# Patient Record
Sex: FEMALE | Race: WHITE | Employment: PART TIME | URBAN - METROPOLITAN AREA
[De-identification: names, ages, dates, MRNs, and addresses within clinical notes are randomized per-mention and may not be internally consistent; named-entity substitution may affect disease eponyms.]

---

## 2017-01-24 ENCOUNTER — OFFICE VISIT (OUTPATIENT)
Dept: FAMILY MEDICINE CLINIC | Age: 62
End: 2017-01-24

## 2017-01-24 ENCOUNTER — HOSPITAL ENCOUNTER (OUTPATIENT)
Dept: LAB | Age: 62
Discharge: HOME | End: 2017-01-24

## 2017-01-24 VITALS
HEART RATE: 77 BPM | DIASTOLIC BLOOD PRESSURE: 67 MMHG | WEIGHT: 237 LBS | SYSTOLIC BLOOD PRESSURE: 146 MMHG | TEMPERATURE: 98.4 F | BODY MASS INDEX: 41.32 KG/M2

## 2017-01-24 DIAGNOSIS — Z23 ENCOUNTER FOR IMMUNIZATION: ICD-10-CM

## 2017-01-24 DIAGNOSIS — E11.9 CONTROLLED TYPE 2 DIABETES MELLITUS WITHOUT COMPLICATION, WITHOUT LONG-TERM CURRENT USE OF INSULIN (HCC): Primary | ICD-10-CM

## 2017-01-24 DIAGNOSIS — M54.50 MIDLINE LOW BACK PAIN WITHOUT SCIATICA, UNSPECIFIED CHRONICITY: ICD-10-CM

## 2017-01-24 DIAGNOSIS — E11.9 CONTROLLED TYPE 2 DIABETES MELLITUS WITHOUT COMPLICATION, WITHOUT LONG-TERM CURRENT USE OF INSULIN (HCC): ICD-10-CM

## 2017-01-24 LAB
CHOLEST SERPL-MCNC: 144 MG/DL
EST. AVERAGE GLUCOSE BLD GHB EST-MCNC: 137 MG/DL
GLUCOSE POC: NORMAL MG/DL
HBA1C MFR BLD: 6.4 % (ref 4.2–6.3)
HDLC SERPL-MCNC: 57 MG/DL
HDLC SERPL: 2.5 {RATIO} (ref 0–5)
LDLC SERPL CALC-MCNC: 59.8 MG/DL (ref 0–100)
LIPID PROFILE,FLP: NORMAL
TRIGL SERPL-MCNC: 136 MG/DL (ref ?–150)
VLDLC SERPL CALC-MCNC: 27.2 MG/DL

## 2017-01-24 PROCEDURE — 83036 HEMOGLOBIN GLYCOSYLATED A1C: CPT

## 2017-01-24 PROCEDURE — 80061 LIPID PANEL: CPT

## 2017-01-24 RX ORDER — LISINOPRIL 10 MG/1
10 TABLET ORAL DAILY
Qty: 30 TAB | Refills: 1 | Status: SHIPPED | OUTPATIENT
Start: 2017-01-24 | End: 2017-03-20 | Stop reason: SDUPTHER

## 2017-01-24 NOTE — PROGRESS NOTES
Coordination of Care  1. Have you been to the ER, urgent care clinic since your last visit? Hospitalized since your last visit? No    2. Have you seen or consulted any other health care providers outside of the [facility] [place] since your last visit? Include any pap smears or colon screening.  No    Medications  Medication Reconciliation Performed: yes  Patient does not need refills     Learning Assessment Complete? yes  Results for orders placed or performed in visit on 01/24/17   AMB POC GLUCOSE BLOOD, BY GLUCOSE MONITORING DEVICE   Result Value Ref Range    Glucose  non fasting mg/dL

## 2017-01-24 NOTE — PROGRESS NOTES
Joy Sharma is an 57 y.o. female who is here for the follow up on diabetes. She reports doing OK, compliant with her medications of Metformin and Mevacor. Denies any side effects. Saw Opt[facility] at [facility] pump, but it was a long time ago, needs to schedule an appointment. Morning BG reading are: , average is 135  Evening BG readings are: 115-128, average is 125    The pt reports having back pain, which comes and goes. Aggravated by physical activity. Alleviated by rest. No radiation of the pain, no tingling, no numbness, no saddle anesthesia. She tried Advil with some relief. BP readings outside the clinic are 117-136 ( she brought the readings with her)    Got the Flu shot this year. Allergies - reviewed: Allergies   Allergen Reactions    Penicillins Unknown (comments)     As child. Medications - reviewed:   Current Outpatient Prescriptions   Medication Sig    lisinopril (PRINIVIL, ZESTRIL) 10 mg tablet Take 1 Tab by mouth daily.  lovastatin (MEVACOR) 20 mg tablet Take 2 Tabs by mouth nightly.  metFORMIN ER (GLUCOPHAGE XR) 500 mg tablet Take 2 Tabs by mouth two (2) times a day. No current facility-administered medications for this visit. Past Medical History - reviewed:  Past Medical History   Diagnosis Date    H/O mammogram 2012     every 6 mos due to polps on right breast with EWL    Hyperlipemia     Pap smear for cervical cancer screening 12/2010         Past Surgical History - reviewed:   No past surgical history on file. Social History - reviewed:  Social History     Social History    Marital status:      Spouse name: N/A    Number of children: N/A    Years of education: N/A     Occupational History    Not on file.      Social History Main Topics    Smoking status: Former Smoker     Packs/day: 0.50     Years: 16.00     Types: Cigarettes    Smokeless tobacco: Not on file      Comment: almost 30 years ago quit   [employer] Alcohol use No    Drug use: No    Sexual activity: Not on file     Other Topics Concern    Not on file     Social History Narrative         Family History - reviewed:  Family History   Problem Relation Age of Onset    Diabetes Mother     Kidney Disease Mother      polycystic kidney disease    Asthma Mother     Hypertension Mother     Heart Disease Mother     Diabetes Sister     Kidney Disease Sister      polycystic kidney disease    Heart Disease Maternal Grandmother     Hypertension Maternal Grandmother     Heart Disease Maternal Grandfather     Cancer Father      ?  Heart Disease Father     Hypertension Father          Immunizations - reviewed:   Immunization History   Administered Date(s) Administered    Influenza Vaccine 10/02/2016         ROS  Review of Systems : negative unless highlighted  EYES: diplopia. Blurry vision. Visual changes  CARDIOVASCULAR: chest pain. palpitations  RESPIRATORY: dyspnea. Cough. Wheeze. GI: abdominal pain. Hemetochezia. Melana. : dysuria. Hematuria. Urgency. Itching. Burning. Discharge. NEURO: numbness. Tingling. Weakness. MUSCULOSKELETAL: Back pain. arthralgias. Myalgias. Edema. Physical Exam  Visit Vitals    /67 (BP 1 Location: Left arm, BP Patient Position: Sitting)    Pulse 77    Temp 98.4 °F (36.9 °C) (Oral)    Wt 237 lb (107.5 kg)    BMI 41.32 kg/m2       General appearance - NAD. Appropriately conversational  Respiratory - LCTAB. No wheeze/rale/rhonchi  Heart - Normal rate, regular rhythm. No m/r/r  Abdomen - Soft, non tender. Non distended. Musculoskeletal - Mild tenderness in the lumbar area , over the paraspinal muscle bilaterally. Normal ROM, Gait normal.    Extremities - No LE edema. No cyanosis. Skin - normal coloration and normal turgor. No cyanosis, no rash. Assessment/Plan  1.  Controlled type 2 diabetes mellitus without complication, without long-term current use of insulin (HCC)    - AMB POC GLUCOSE BLOOD, BY GLUCOSE MONITORING DEVICE  - Will check HgA1C today  -The pt was advised to log in preprandial and postprandial reading of BG    2. Elevated blood pressureThe BP is in the higher range in the clinic than at home.   -Will start Lisinopril 10 mg for the elevated BP and for kidney protection as well    3. Back pain,  Most likely muscular-skeletal  - The patient was advised to try heating pad, stretching exercise   -Ibuprofen was recommended for the acute pain    Follow-up Disposition:  Return in about 4 weeks (around 2/21/2017) for BP follow-up. I discussed the aforementioned diagnoses with the patient as well as the plan of care.      The pt was seen and discussed with Dr. Sofía Hatch (The attending physician)    Shelia Leong MD  Family Medicine Resident  PGY 1

## 2017-01-24 NOTE — PROGRESS NOTES
Scott Kaye completed screening documentation. No contraindications for administering today's ordered vaccines. Vaccine Immunization Statement(s) given and instructions for adverse reaction. No adverse reaction noted at time of discharge, explained possible s/e. Reviewed symptoms indicating need to be seen in ER. Patient given ppv23 vaccine; denies fever. Pt had no adverse reaction at time of d/c. Vaccine administration documented into [place] Immunization Information System.

## 2017-01-24 NOTE — PATIENT INSTRUCTIONS
ACE Inhibitors: Care Instructions  Your Care Instructions  ACE (angiotensin-converting enzyme) inhibitors lower blood pressure. They also treat heart failure and prevent heart attacks and strokes. They block an enzyme that makes blood vessels narrow. As a result, the blood vessels relax and widen. This lowers blood pressure. These medicines also put more water and salt into the urine. This lowers blood pressure too. These medicines are a good choice for people with diabetes. They don't affect blood sugar levels, and they may protect the kidneys. Examples include:  · Benazepril (Lotensin). · Lisinopril (Prinivil, Zestril). · Ramipril (Altace). Before you start taking this medicine, make sure your doctor knows if you take other medicines, especially water pills (diuretics) or potassium tablets. And tell your doctor if you use a salt substitute. You should not take an ACE inhibitor if you are pregnant or planning to become pregnant. You may need regular blood tests. Follow-up care is a key part of your treatment and safety. Be sure to make and go to all appointments, and call your doctor if you are having problems. It's also a good idea to know your test results and keep a list of the medicines you take. How can you care for yourself at home? · Take your medicines exactly as prescribed. Be sure to take high blood pressure medicine every day. Since high blood pressure often has no symptoms, it is easy to forget to take the pills. Call your doctor if you think you are having a problem with your medicine. · ACE inhibitors can cause a dry cough. If the cough is bad, talk to your doctor. You may need to try a different medicine. · ACE inhibitors can cause an allergic reaction of the skin. Symptoms may range from mild swelling to painful welts. Severe swelling can make it hard to breathe, but this is very rare. · Check with your doctor or pharmacist before you use any other medicines.  This includes over-the-counter medicines. Make sure your doctor knows all of the medicines, vitamins, herbal products, and supplements you take. Taking some medicines together can cause problems. When should you call for help? Call your doctor now or seek immediate medical care if:  · You have trouble breathing. · You have swelling in your face, head, neck, or tongue. · You are dizzy or lightheaded, or you feel like you may faint. Watch closely for changes in your health, and be sure to contact your doctor if you have any problems. Where can you learn more? Go to http://huberF3 Foodsemory.info/. Enter Q050 in the search box to learn more about \"ACE Inhibitors: Care Instructions. \"  Current as of: January 27, 2016  Content Version: 11.1  © 2417-1873 [employer]. Care instructions adapted under license by [email or web address] (which disclaims liability or warranty for this information). If you have questions about a medical condition or this instruction, always ask your healthcare professional. [place] [address] any warranty or liability for your use of this information. Back Pain: Care Instructions  Your Care Instructions    Back pain has many possible causes. It is often related to problems with muscles and ligaments of the back. It may also be related to problems with the nerves, discs, or bones of the back. Moving, lifting, standing, sitting, or sleeping in an awkward way can strain the back. Sometimes you don't notice the injury until later. Arthritis is another common cause of back pain. Although it may hurt a lot, back pain usually improves on its own within several weeks. Most people recover in 12 weeks or less. Using good home treatment and being careful not to stress your back can help you feel better sooner. Follow-up care is a key part of your treatment and safety. Be sure to make and go to all appointments, and call your doctor if you are having problems.  Its also a good idea to know your test results and keep a list of the medicines you take. How can you care for yourself at home? · Sit or lie in positions that are most comfortable and reduce your pain. Try one of these positions when you lie down:  ¨ Lie on your back with your knees bent and supported by large pillows. ¨ Lie on the floor with your legs on the seat of a sofa or chair. [place] Left on your side with your knees and hips bent and a pillow between your legs. ¨ Lie on your stomach if it does not make pain worse. · Do not sit up in bed, and avoid soft couches and twisted positions. Bed rest can help relieve pain at first, but it delays healing. Avoid bed rest after the first day of back pain. · Change positions every 30 minutes. If you must sit for long periods of time, take breaks from sitting. Get up and walk around, or lie in a comfortable position. · Try using a heating pad on a low or medium setting for 15 to 20 minutes every 2 or 3 hours. Try a warm shower in place of one session with the heating pad. · You can also try an ice pack for 10 to 15 minutes every 2 to 3 hours. Put a thin cloth between the ice pack and your skin. · Take pain medicines exactly as directed. ¨ If the doctor gave you a prescription medicine for pain, take it as prescribed. ¨ If you are not taking a prescription pain medicine, ask your doctor if you can take an over-the-counter medicine. · Take short walks several times a day. You can start with 5 to 10 minutes, 3 or 4 times a day, and work up to longer walks. Walk on level surfaces and avoid hills and stairs until your back is better. · Return to work and other activities as soon as you can. Continued rest without activity is usually not good for your back. · To prevent future back pain, do exercises to stretch and strengthen your back and stomach. Learn how to use good posture, safe lifting techniques, and proper body mechanics. When should you call for help?   Call your doctor now or seek immediate medical care if:  · You have new or worsening numbness in your legs. · You have new or worsening weakness in your legs. (This could make it hard to stand up.)  · You lose control of your bladder or bowels. Watch closely for changes in your health, and be sure to contact your doctor if:  · Your pain gets worse. · You are not getting better after 2 weeks. Where can you learn more? Go to http://huber-emory.info/. Enter Q307 in the search box to learn more about \"Back Pain: Care Instructions. \"  Current as of: May 23, 2016  Content Version: 11.1  © 8431-0352 [employer]. Care instructions adapted under license by [employer] (which disclaims liability or warranty for this information). If you have questions about a medical condition or this instruction, always ask your healthcare professional. [address] any warranty or liability for your use of this information.

## 2017-01-24 NOTE — MR AVS SNAPSHOT
Visit Information Date & Time Provider Department Dept. Phone Encounter #  
 1/24/2017  1:35 PM Adolph Santos MD [facility] [phone] [id number] Follow-up Instructions Return in about 4 weeks (around 2/21/2017) for BP follow-up. Upcoming Health Maintenance Date Due Hepatitis C Screening 1955 EYE EXAM RETINAL OR DILATED Q1 12/27/1964 Pneumococcal 19-64 Medium Risk (1 of 1 - PPSV23) 12/27/1973 DTaP/Tdap/Td series (1 - Tdap) 12/27/1975 FOBT Q 1 YEAR AGE 50-75 12/27/2004 ZOSTER VACCINE AGE 60> 12/27/2014 HEMOGLOBIN A1C Q6M 1/20/2017 FOOT EXAM Q1 9/29/2017 MICROALBUMIN Q1 9/29/2017 LIPID PANEL Q1 9/29/2017 BREAST CANCER SCRN MAMMOGRAM 4/18/2018 PAP AKA CERVICAL CYTOLOGY 4/18/2019 Allergies as of 1/24/2017  Review Complete On: 1/24/2017 By: Adolph Santos MD  
  
 Severity Noted Reaction Type Reactions Penicillins Low 05/16/2013    Unknown (comments) As child. Current Immunizations  Reviewed on 1/24/2017 Name Date Influenza Vaccine 10/2/2016 Pneumococcal Polysaccharide (PPSV-23)  Incomplete Reviewed by Samm Carmen MD on 1/24/2017 at  1:37 PM  
 Reviewed by Samm Carmen MD on 1/24/2017 at  1:57 PM  
 Reviewed by Samm Carmen MD on 1/24/2017 at  1:57 PM  
You Were Diagnosed With   
  
 Codes Comments Controlled type 2 diabetes mellitus without complication, without long-term current use of insulin ([facility] [address]    -  Primary ICD-10-CM: E11.9 ICD-9-CM: 250.00 Hypertension associated with diabetes ([address]     ICD-10-CM: E11.59, I10 
ICD-9-CM: 250.80, 401.9 Midline low back pain without sciatica, unspecified chronicity     ICD-10-CM: M54.5 ICD-9-CM: 724.2 Encounter for immunization     ICD-10-CM: A90 ICD-9-CM: V03.89 Vitals BP Pulse Temp Weight(growth percentile) BMI OB Status 146/67 (BP 1 Location: Left arm, BP Patient Position: Sitting) 77 98.4 °F (36.9 °C) (Oral) 237 lb (107.5 kg) 41.32 kg/m2 Postmenopausal  
 Smoking Status Former Smoker Vitals History BMI and BSA Data Body Mass Index Body Surface Area  
 41.32 kg/m 2 2.19 m 2 Preferred Pharmacy Pharmacy Name Phone [facility] [address] [place] [address], [address] [place] [address],[address] [phone] Your Updated Medication List  
  
   
This list is accurate as of: 1/24/17  2:15 PM.  Always use your most recent med list.  
  
  
  
  
 lisinopril 10 mg tablet Commonly known as:  Teresa Primmer Take 1 Tab by mouth daily. lovastatin 20 mg tablet Commonly known as:  MEVACOR Take 2 Tabs by mouth nightly. metFORMIN  mg tablet Commonly known as:  GLUCOPHAGE XR Take 2 Tabs by mouth two (2) times a day. Prescriptions Sent to Pharmacy Refills  
 lisinopril (PRINIVIL, ZESTRIL) 10 mg tablet 1 Sig: Take 1 Tab by mouth daily. Class: Normal  
 Pharmacy: [address] [address] [facility], [address] [place] [address],[address] Ph #: [phone] Route: Oral  
  
We Performed the Following AMB POC GLUCOSE BLOOD, BY GLUCOSE MONITORING DEVICE [00475 CPT(R)] PNEUMOCOCCAL POLYSACCHARIDE VACCINE, 23-VALENT, ADULT OR IMMUNOSUPPRESSED PT DOSE, [21374 CPT(R)] WA IMMUNIZ ADMIN,1 SINGLE/COMB VAC/TOXOID [id number] CPT(R)] Follow-up Instructions Return in about 4 weeks (around 2/21/2017) for BP follow-up. To-Do List   
 01/24/2017 Lab:  HEMOGLOBIN A1C WITH EAG   
  
 01/24/2017 Lab:  LIPID PANEL Patient Instructions ACE Inhibitors: Care Instructions Your Care Instructions ACE (angiotensin-converting enzyme) inhibitors lower blood pressure. They also treat heart failure and prevent heart attacks and strokes. They block an enzyme that makes blood vessels narrow.  As a result, the blood vessels relax and widen. This lowers blood pressure. These medicines also put more water and salt into the urine. This lowers blood pressure too. These medicines are a good choice for people with diabetes. They don't affect blood sugar levels, and they may protect the kidneys. Examples include: · Benazepril (Lotensin). · Lisinopril (Prinivil, Zestril). · Ramipril (Altace). Before you start taking this medicine, make sure your doctor knows if you take other medicines, especially water pills (diuretics) or potassium tablets. And tell your doctor if you use a salt substitute. You should not take an ACE inhibitor if you are pregnant or planning to become pregnant. You may need regular blood tests. Follow-up care is a key part of your treatment and safety. Be sure to make and go to all appointments, and call your doctor if you are having problems. It's also a good idea to know your test results and keep a list of the medicines you take. How can you care for yourself at home? · Take your medicines exactly as prescribed. Be sure to take high blood pressure medicine every day. Since high blood pressure often has no symptoms, it is easy to forget to take the pills. Call your doctor if you think you are having a problem with your medicine. · ACE inhibitors can cause a dry cough. If the cough is bad, talk to your doctor. You may need to try a different medicine. · ACE inhibitors can cause an allergic reaction of the skin. Symptoms may range from mild swelling to painful welts. Severe swelling can make it hard to breathe, but this is very rare. · Check with your doctor or pharmacist before you use any other medicines. This includes over-the-counter medicines. Make sure your doctor knows all of the medicines, vitamins, herbal products, and supplements you take. Taking some medicines together can cause problems. When should you call for help? Call your doctor now or seek immediate medical care if: · You have trouble breathing. · You have swelling in your face, head, neck, or tongue. · You are dizzy or lightheaded, or you feel like you may faint. Watch closely for changes in your health, and be sure to contact your doctor if you have any problems. Where can you learn more? Go to http://huber-emory.info/. Enter Z153 in the search box to learn more about \"ACE Inhibitors: Care Instructions. \" Current as of: January 27, 2016 Content Version: 11.1 © 6250-8062 [employer]. Care instructions adapted under license by [employer] (which disclaims liability or warranty for this information). If you have questions about a medical condition or this instruction, always ask your healthcare professional. [address] any warranty or liability for your use of this information. Back Pain: Care Instructions Your Care Instructions Back pain has many possible causes. It is often related to problems with muscles and ligaments of the back. It may also be related to problems with the nerves, discs, or bones of the back. Moving, lifting, standing, sitting, or sleeping in an awkward way can strain the back. Sometimes you don't notice the injury until later. Arthritis is another common cause of back pain. Although it may hurt a lot, back pain usually improves on its own within several weeks. Most people recover in 12 weeks or less. Using good home treatment and being careful not to stress your back can help you feel better sooner. Follow-up care is a key part of your treatment and safety. Be sure to make and go to all appointments, and call your doctor if you are having problems. Its also a good idea to know your test results and keep a list of the medicines you take. How can you care for yourself at home? · Sit or lie in positions that are most comfortable and reduce your pain. Try one of these positions when you lie down: ¨ Lie on your back with your knees bent and supported by large pillows. ¨ Lie on the floor with your legs on the seat of a sofa or chair. Clevester Sanes on your side with your knees and hips bent and a pillow between your legs. ¨ Lie on your stomach if it does not make pain worse. · Do not sit up in bed, and avoid soft couches and twisted positions. Bed rest can help relieve pain at first, but it delays healing. Avoid bed rest after the first day of back pain. · Change positions every 30 minutes. If you must sit for long periods of time, take breaks from sitting. Get up and walk around, or lie in a comfortable position. · Try using a heating pad on a low or medium setting for 15 to 20 minutes every 2 or 3 hours. Try a warm shower in place of one session with the heating pad. · You can also try an ice pack for 10 to 15 minutes every 2 to 3 hours. Put a thin cloth between the ice pack and your skin. · Take pain medicines exactly as directed. ¨ If the doctor gave you a prescription medicine for pain, take it as prescribed. ¨ If you are not taking a prescription pain medicine, ask your doctor if you can take an over-the-counter medicine. · Take short walks several times a day. You can start with 5 to 10 minutes, 3 or 4 times a day, and work up to longer walks. Walk on level surfaces and avoid hills and stairs until your back is better. · Return to work and other activities as soon as you can. Continued rest without activity is usually not good for your back. · To prevent future back pain, do exercises to stretch and strengthen your back and stomach. Learn how to use good posture, safe lifting techniques, and proper body mechanics. When should you call for help? Call your doctor now or seek immediate medical care if: 
· You have new or worsening numbness in your legs. · You have new or worsening weakness in your legs. (This could make it hard to stand up.) · You lose control of your bladder or bowels. Watch closely for changes in your health, and be sure to contact your doctor if: 
· Your pain gets worse. · You are not getting better after 2 weeks. Where can you learn more? Go to http://huber-emory.info/. Enter S598 in the search box to learn more about \"Back Pain: Care Instructions. \" Current as of: May 23, 2016 Content Version: 11.1 © 7284-8768 [employer]. Care instructions adapted under license by [employer] (which disclaims liability or warranty for this information). If you have questions about a medical condition or this instruction, always ask your healthcare professional. [place] [address] any warranty or liability for your use of this information. Introducing [facility]! [address] introduces [employer] patient portal. Now you can access parts of your medical record, email your doctor's office, and request medication refills online. 1. In your internet browser, go to [email or web address] 2. Click on the First Time User? Click Here link in the Sign In box. You will see the New Member Sign Up page. 3. Enter your [employer] Access Code exactly as it appears below. You will not need to use this code after youve completed the sign-up process. If you do not sign up before the expiration date, you must request a new code. · [employer] Access Code: [email or web address] Expires: 1/31/2017 11:20 AM 
 
4. Enter the last four digits of your Social Security Number (xxxx) and Date of Birth (mm/dd/yyyy) as indicated and click Submit. You will be taken to the next sign-up page. 5. Create a [employer] ID. This will be your [employer] login ID and cannot be changed, so think of one that is secure and easy to remember. 6. Create a [employer] password. You can change your password at any time. 7. Enter your Password Reset Question and Answer. This can be used at a later time if you forget your password. 8. Enter your e-mail address. You will receive e-mail notification when new information is available in [address] 9. Click Sign Up. You can now view and download portions of your medical record. 10. Click the Download Summary menu link to download a portable copy of your medical information. If you have questions, please visit the Frequently Asked Questions section of the [employer] website. Remember, [employer] is NOT to be used for urgent needs. For medical emergencies, dial 911. Now available from your iPhone and Android! Please provide this summary of care documentation to your next provider. Your primary care clinician is listed as Marcus Walton. If you have any questions after today's visit, please call [phone].

## 2017-02-21 ENCOUNTER — OFFICE VISIT (OUTPATIENT)
Dept: FAMILY MEDICINE CLINIC | Age: 62
End: 2017-02-21

## 2017-02-21 VITALS
TEMPERATURE: 98.6 F | DIASTOLIC BLOOD PRESSURE: 58 MMHG | HEART RATE: 78 BPM | BODY MASS INDEX: 40.8 KG/M2 | SYSTOLIC BLOOD PRESSURE: 135 MMHG | WEIGHT: 234 LBS

## 2017-02-21 DIAGNOSIS — I10 ESSENTIAL HYPERTENSION: ICD-10-CM

## 2017-02-21 DIAGNOSIS — E11.9 CONTROLLED TYPE 2 DIABETES MELLITUS WITHOUT COMPLICATION, WITHOUT LONG-TERM CURRENT USE OF INSULIN (HCC): Primary | ICD-10-CM

## 2017-02-21 DIAGNOSIS — E78.00 PURE HYPERCHOLESTEROLEMIA: ICD-10-CM

## 2017-02-21 LAB — GLUCOSE POC: NORMAL MG/DL

## 2017-02-21 NOTE — PROGRESS NOTES
Coordination of Care  1. Have you been to the ER, urgent care clinic since your last visit? Hospitalized since your last visit? No    2. Have you seen or consulted any other health care providers outside of the [facility] since your last visit? Include any pap smears or colon screening.  No    Medications  Medication Reconciliation Performed: yes  Patient does need refills     Learning Assessment Complete? yes  Results for orders placed or performed in visit on 02/21/17   AMB POC GLUCOSE BLOOD, BY GLUCOSE MONITORING DEVICE   Result Value Ref Range    Glucose  nonfasting mg/dL

## 2017-02-21 NOTE — PROGRESS NOTES
[facility] - pt given appointment on April 27th at 11 am.  Asked pt to take the referral form with her and $15 fee.

## 2017-02-21 NOTE — PROGRESS NOTES
Subjective:     Blake Schmitz is a 57 y.o. female who presents for follow up of diabetes and hyperlipidemia. Having some chronic low back pain that she says limits doing much exercise. No problems after starting lisinopril. Patient Active Problem List   Diagnosis Code    Chronic lower back pain M54.5, G89.29    Elevated blood pressure I10    Controlled diabetes mellitus type II without complication ([facility]) R21.0    Pure hypercholesterolemia E78.00       Current Outpatient Prescriptions   Medication Sig    lisinopril (PRINIVIL, ZESTRIL) 10 mg tablet Take 1 Tab by mouth daily.  lovastatin (MEVACOR) 20 mg tablet Take 2 Tabs by mouth nightly.  metFORMIN ER (GLUCOPHAGE XR) 500 mg tablet Take 2 Tabs by mouth two (2) times a day. No current facility-administered medications for this visit. Allergies   Allergen Reactions    Penicillins Unknown (comments)     As child. Diet and Lifestyle: follows a diabetic diet regularly      AM glucoses:  120 - 150  Post Prandial glucoses:  90 - 160  Evening glucoses:  130 - 150    BP readings outside office:  120 - 130 / 50 - 70. Cardiovascular ROS: taking medications as instructed, no medication side effects noted, no TIA's, no chest pain on exertion, no dyspnea on exertion, no swelling of ankles. Review of Systems, additional:  Pertinent items are noted in HPI. Family History   Problem Relation Age of Onset    Diabetes Mother     Kidney Disease Mother      polycystic kidney disease    Asthma Mother     Hypertension Mother     Heart Disease Mother     Diabetes Sister     Kidney Disease Sister      polycystic kidney disease    Heart Disease Maternal Grandmother     Hypertension Maternal Grandmother     Heart Disease Maternal Grandfather     Cancer Father      ?     Heart Disease Father     Hypertension Father      Social History   Substance Use Topics    Smoking status: Former Smoker     Packs/day: 0.50     Years: 16.00 Types: Cigarettes    Smokeless tobacco: Not on file      Comment: almost 30 years ago quit    Alcohol use No          Objective:     Visit Vitals    /58 (BP 1 Location: Right arm, BP Patient Position: Sitting)    Pulse 78    Temp 98.6 °F (37 °C) (Oral)    Wt 234 lb (106.1 kg)    BMI 40.8 kg/m2     Body mass index is 40.8 kg/(m^2).     Physical Exam  General appearance: alert, cooperative, no distress, appears stated age    Lungs: clear to auscultation bilaterally, no wheezes, no increased work of breathing  Heart: regular rate and rhythm, S1, S2 normal, no murmur, click, rub or gallop  Extremities: extremities normal, no cyanosis or edema  Skin: color, texture, turgor normal. No rashes or lesions  Neurologic: Alert and oriented, grossly normal exam. Normal coordination and gait      Lab results below reviewed at this visit:  Results for orders placed or performed in visit on 02/21/17   AMB POC GLUCOSE BLOOD, BY GLUCOSE MONITORING DEVICE   Result Value Ref Range    Glucose  nonfasting mg/dL       Lab Results   Component Value Date/Time    Hemoglobin A1c 6.4 01/24/2017 02:26 PM       Lab Results   Component Value Date/Time    Sodium 139 06/06/2016 03:48 PM    Potassium 3.8 06/06/2016 03:48 PM    Chloride 106 06/06/2016 03:48 PM    CO2 26 06/06/2016 03:48 PM    Anion gap 7 06/06/2016 03:48 PM    Glucose 224 06/06/2016 03:48 PM    BUN 11 06/06/2016 03:48 PM    Creatinine 0.70 06/06/2016 03:48 PM    BUN/Creatinine ratio 16 06/06/2016 03:48 PM    GFR est AA >60 06/06/2016 03:48 PM    GFR est non-AA >60 06/06/2016 03:48 PM    Calcium 8.4 06/06/2016 03:48 PM       Lab Results   Component Value Date/Time    Microalbumin/Creat ratio (mg/g creat) 6 09/29/2016 03:21 PM    Microalbumin,urine random 0.61 09/29/2016 03:21 PM       Lab Results   Component Value Date/Time    Cholesterol, total 144 01/24/2017 02:26 PM    HDL Cholesterol 57 01/24/2017 02:26 PM    LDL, calculated 59.8 01/24/2017 02:26 PM    VLDL, calculated 27.2 01/24/2017 02:26 PM    Triglyceride 136 01/24/2017 02:26 PM    CHOL/HDL Ratio 2.5 01/24/2017 02:26 PM         Assessment/Plan:       ICD-10-CM ICD-9-CM    1. Controlled type 2 diabetes mellitus without complication, without long-term current use of insulin ([facility]) E11.9 250.00 AMB POC GLUCOSE BLOOD, BY GLUCOSE MONITORING DEVICE      REFERRAL TO OPHTHALMOLOGY   2. Pure hypercholesterolemia E78.00 272.0    3. Essential hypertension I10 401.9        She seems to be doing quite well. Discussed taking NSAID's for her back pain. Encouraged to try to move more for cardiovascular benefit and help with back. Consider decreasing metformin if she continues to have a low A1c later. Referral to [facility] for eye exam.      Follow-up Disposition:  Return in about 3 months (around 5/21/2017).

## 2017-03-20 DIAGNOSIS — I10 UNSPECIFIED ESSENTIAL HYPERTENSION: Primary | ICD-10-CM

## 2017-04-03 RX ORDER — LISINOPRIL 10 MG/1
TABLET ORAL
Qty: 30 TAB | Refills: 1 | Status: SHIPPED | OUTPATIENT
Start: 2017-04-03 | End: 2017-05-23 | Stop reason: SDUPTHER

## 2017-04-03 NOTE — TELEPHONE ENCOUNTER
Pt called to ask for refill of Lisinopril, she ran out 3 days ago.   Refilled #30/1 pt has f/u appt in May

## 2017-04-08 ENCOUNTER — HOSPITAL ENCOUNTER (OUTPATIENT)
Dept: MAMMOGRAPHY | Age: 62
Discharge: HOME | End: 2017-04-08
Attending: NURSE PRACTITIONER

## 2017-04-08 ENCOUNTER — OFFICE VISIT (OUTPATIENT)
Dept: FAMILY PLANNING/WOMEN'S HEALTH CLINIC | Age: 62
End: 2017-04-08

## 2017-04-08 VITALS — SYSTOLIC BLOOD PRESSURE: 148 MMHG | DIASTOLIC BLOOD PRESSURE: 80 MMHG

## 2017-04-08 DIAGNOSIS — Z12.31 SCREENING MAMMOGRAM, ENCOUNTER FOR: ICD-10-CM

## 2017-04-08 DIAGNOSIS — Z12.31 SCREENING MAMMOGRAM, ENCOUNTER FOR: Primary | ICD-10-CM

## 2017-04-08 PROCEDURE — 77067 SCR MAMMO BI INCL CAD: CPT

## 2017-04-08 NOTE — PROGRESS NOTES
HISTORY OF PRESENT ILLNESS  Chema Mcdonald is a 57 y.o. female. HPI  59yoF here for EWL exam. She denies abnormal SBE's, performs monthly. Post-menopause. Last colonoscopy was \"a long time ago\". She is followed by [facility] for DM and HTN. Her next appt is in May. ROS    Physical Exam   Constitutional: She is oriented to person, place, and time. She appears well-developed and well-nourished. Pulmonary/Chest: Right breast exhibits no inverted nipple, no mass, no nipple discharge, no skin change and no tenderness. Left breast exhibits no inverted nipple, no mass, no nipple discharge, no skin change and no tenderness. Breasts are symmetrical.   Genitourinary: Rectum normal, vagina normal and uterus normal. Rectal exam shows no mass, no tenderness and guaiac negative stool. Pelvic exam was performed with patient supine. There is no rash, tenderness or lesion on the right labia. There is no rash, tenderness or lesion on the left labia. Cervix exhibits no motion tenderness, no discharge and no friability. Right adnexum displays no mass, no tenderness and no fullness. Left adnexum displays no mass, no tenderness and no fullness. No erythema, tenderness or bleeding in the vagina. No vaginal discharge found. Lymphadenopathy:     She has no cervical adenopathy. She has no axillary adenopathy. Right: No inguinal and no supraclavicular adenopathy present. Left: No inguinal and no supraclavicular adenopathy present. Neurological: She is alert and oriented to person, place, and time. Skin: Skin is warm and dry. Psychiatric: She has a normal mood and affect. Her behavior is normal. Thought content normal.   Nursing note and vitals reviewed. ASSESSMENT and PLAN  1. EWL exam  2. Screening mammogram today  3. Colonoscopy GL's discussed  4.  BP GL's discussed- monitor this week and report findings to SJO  5. Post-menopause

## 2017-04-26 DIAGNOSIS — E11.9 CONTROLLED DIABETES MELLITUS TYPE II WITHOUT COMPLICATION (HCC): ICD-10-CM

## 2017-04-27 RX ORDER — METFORMIN HYDROCHLORIDE 500 MG/1
TABLET, EXTENDED RELEASE ORAL
Qty: 120 TAB | Refills: 5 | Status: SHIPPED | OUTPATIENT
Start: 2017-04-27 | End: 2017-11-28 | Stop reason: SDUPTHER

## 2017-05-23 ENCOUNTER — OFFICE VISIT (OUTPATIENT)
Dept: FAMILY MEDICINE CLINIC | Age: 62
End: 2017-05-23

## 2017-05-23 VITALS
TEMPERATURE: 98.2 F | SYSTOLIC BLOOD PRESSURE: 150 MMHG | WEIGHT: 241 LBS | HEART RATE: 76 BPM | DIASTOLIC BLOOD PRESSURE: 63 MMHG | BODY MASS INDEX: 42.02 KG/M2

## 2017-05-23 DIAGNOSIS — E78.00 PURE HYPERCHOLESTEROLEMIA: ICD-10-CM

## 2017-05-23 DIAGNOSIS — I10 UNSPECIFIED ESSENTIAL HYPERTENSION: ICD-10-CM

## 2017-05-23 DIAGNOSIS — E11.9 CONTROLLED TYPE 2 DIABETES MELLITUS WITHOUT COMPLICATION, WITHOUT LONG-TERM CURRENT USE OF INSULIN (HCC): Primary | ICD-10-CM

## 2017-05-23 LAB — GLUCOSE POC: NORMAL MG/DL

## 2017-05-23 RX ORDER — LOVASTATIN 20 MG/1
40 TABLET ORAL
Qty: 180 TAB | Refills: 1 | Status: SHIPPED | OUTPATIENT
Start: 2017-05-23 | End: 2017-11-28 | Stop reason: SDUPTHER

## 2017-05-23 RX ORDER — LISINOPRIL 10 MG/1
10 TABLET ORAL DAILY
Qty: 90 TAB | Refills: 1 | Status: SHIPPED | OUTPATIENT
Start: 2017-05-23 | End: 2017-11-28 | Stop reason: SDUPTHER

## 2017-05-23 NOTE — PROGRESS NOTES
Subjective:     Mindi Galvan is a 57 y.o. female who presents for follow up of diabetes, hypertension and hyperlipidemia. She is in with her daughter. Her back and leg are hurting more lately so has had less exercise. Is taking ibuprofen 800 mg. every 6 - 8 hr. Had normal eye exam at [facility] on 4/27. Got new glasses. Patient Active Problem List   Diagnosis Code    Chronic lower back pain M54.5, G89.29    Controlled diabetes mellitus type II without complication ([facility]) Z93.1    Pure hypercholesterolemia E78.00    Essential hypertension I10       Current Outpatient Prescriptions   Medication Sig    metFORMIN ER (GLUCOPHAGE XR) 500 mg tablet TAKE TWO TABLETS BY MOUTH TWICE DAILY    lisinopril (PRINIVIL, ZESTRIL) 10 mg tablet TAKE ONE TABLET BY MOUTH ONCE DAILY    lovastatin (MEVACOR) 20 mg tablet Take 2 Tabs by mouth nightly. No current facility-administered medications for this visit. Allergies   Allergen Reactions    Penicillins Unknown (comments)     As child. Diet and Lifestyle: follows a diabetic diet regularly    Brings in her detailed glucose log as usual.  AM glucoses:  125 - 164  Post Prandial glucoses:  160 or less  Evening glucoses:  145 - 173    BP readings outside office:  111 - 125 / 65 - 84    Cardiovascular ROS: taking medications as instructed, no medication side effects noted, no TIA's, no chest pain on exertion, no dyspnea on exertion, no swelling of ankles. Review of Systems, additional:  Pertinent items are noted in HPI. No past surgical history on file.   Family History   Problem Relation Age of Onset    Diabetes Mother     Kidney Disease Mother      polycystic kidney disease    Asthma Mother     Hypertension Mother     Heart Disease Mother     Diabetes Sister     Kidney Disease Sister      polycystic kidney disease    Heart Disease Maternal Grandmother     Hypertension Maternal Grandmother     Heart Disease Maternal Grandfather  Cancer Father      ?  Heart Disease Father     Hypertension Father      Social History   Substance Use Topics    Smoking status: Former Smoker     Packs/day: 0.50     Years: 16.00     Types: Cigarettes    Smokeless tobacco: Not on file      Comment: almost 30 years ago quit    Alcohol use No          Objective:     Visit Vitals    /63 (BP 1 Location: Left arm, BP Patient Position: Sitting)    Pulse 76    Temp 98.2 °F (36.8 °C) (Oral)    Wt 241 lb (109.3 kg)    BMI 42.02 kg/m2     Body mass index is 42.02 kg/(m^2).     Physical Exam  General appearance: alert, cooperative, no distress, appears stated age    Lungs: clear to auscultation bilaterally, no wheezes, no increased work of breathing  Heart: regular rate and rhythm, S1, S2 normal, no murmur, click, rub or gallop  Extremities: extremities normal, no cyanosis or edema  Skin: color, texture, turgor normal. No rashes or lesions  Neurologic: Alert and oriented, grossly normal exam. Normal coordination and gait      Lab results below reviewed at this visit:  Results for orders placed or performed in visit on 05/23/17   AMB POC GLUCOSE BLOOD, BY GLUCOSE MONITORING DEVICE   Result Value Ref Range    Glucose POC  mg/dL       Lab Results   Component Value Date/Time    Hemoglobin A1c 6.4 01/24/2017 02:26 PM       Lab Results   Component Value Date/Time    Sodium 139 06/06/2016 03:48 PM    Potassium 3.8 06/06/2016 03:48 PM    Chloride 106 06/06/2016 03:48 PM    CO2 26 06/06/2016 03:48 PM    Anion gap 7 06/06/2016 03:48 PM    Glucose 224 06/06/2016 03:48 PM    BUN 11 06/06/2016 03:48 PM    Creatinine 0.70 06/06/2016 03:48 PM    BUN/Creatinine ratio 16 06/06/2016 03:48 PM    GFR est AA >60 06/06/2016 03:48 PM    GFR est non-AA >60 06/06/2016 03:48 PM    Calcium 8.4 06/06/2016 03:48 PM       Lab Results   Component Value Date/Time    Microalbumin/Creat ratio (mg/g creat) 6 09/29/2016 03:21 PM    Microalbumin,urine random 0.61 09/29/2016 03:21 PM Lab Results   Component Value Date/Time    Cholesterol, total 144 01/24/2017 02:26 PM    HDL Cholesterol 57 01/24/2017 02:26 PM    LDL, calculated 59.8 01/24/2017 02:26 PM    VLDL, calculated 27.2 01/24/2017 02:26 PM    Triglyceride 136 01/24/2017 02:26 PM    CHOL/HDL Ratio 2.5 01/24/2017 02:26 PM         Assessment/Plan:       ICD-10-CM ICD-9-CM    1. Controlled type 2 diabetes mellitus without complication, without long-term current use of insulin (HCC) E11.9 250.00 AMB POC GLUCOSE BLOOD, BY GLUCOSE MONITORING DEVICE   2. Unspecified essential hypertension I10 401.9 lisinopril (PRINIVIL, ZESTRIL) 10 mg tablet   3. Pure hypercholesterolemia E78.00 272.0 lovastatin (MEVACOR) 20 mg tablet       Asked her to try taking some Tylenol (regular strength)  along with ibuprofen sometimes so as to reduce dose and hopefully get better relief. Discussed maximum doses. Asked her to try shorter exercise periods when hurting but not stop. Needs to reduce weight. Discussed evening diet as cause of higher morning glucoses. Since her blood pressure seems more well controlled at home I will not make a change in her therapy at this time. Her lipids are at goal.    Follow-up Disposition:  Return in about 3 months (around 8/23/2017). Lab work next visit.

## 2017-05-23 NOTE — PROGRESS NOTES
Coordination of Care  1. Have you been to the ER, urgent care clinic since your last visit? Hospitalized since your last visit? No    2. Have you seen or consulted any other health care providers outside of the [address] [place] [address] since your last visit? Include any pap smears or colon screening.  No    Medications  Medication Reconciliation Performed: yes  Patient does need refills     Learning Assessment Complete? yes    Results for orders placed or performed in visit on 05/23/17   AMB POC GLUCOSE BLOOD, BY GLUCOSE MONITORING DEVICE   Result Value Ref Range    Glucose POC  mg/dL

## 2017-05-23 NOTE — MR AVS SNAPSHOT
Visit Information Date & Time Provider Department Dept. Phone Encounter #  
 5/23/2017  1:35 PM Dejan Thompson, [facility] [phone] [id number] Follow-up Instructions Return in about 3 months (around 8/23/2017). Upcoming Health Maintenance Date Due Hepatitis C Screening 1955 DTaP/Tdap/Td series (1 - Tdap) 12/27/1975 FOBT Q 1 YEAR AGE 50-75 12/27/2004 ZOSTER VACCINE AGE 60> 12/27/2014 HEMOGLOBIN A1C Q6M 7/24/2017 INFLUENZA AGE 9 TO ADULT 8/1/2017 FOOT EXAM Q1 9/29/2017 MICROALBUMIN Q1 9/29/2017 LIPID PANEL Q1 1/24/2018 EYE EXAM RETINAL OR DILATED Q1 5/15/2018 BREAST CANCER SCRN MAMMOGRAM 4/8/2019 PAP AKA CERVICAL CYTOLOGY 4/18/2019 Allergies as of 5/23/2017  Review Complete On: 5/23/2017 By: Dejan Thompson MD  
  
 Severity Noted Reaction Type Reactions Penicillins Low 05/16/2013    Unknown (comments) As child. Current Immunizations  Reviewed on 1/24/2017 Name Date Influenza Vaccine 10/2/2016 Pneumococcal Polysaccharide (PPSV-23) 1/24/2017 Not reviewed this visit You Were Diagnosed With   
  
 Codes Comments Controlled type 2 diabetes mellitus without complication, without long-term current use of insulin [facility]    -  Primary ICD-10-CM: E11.9 ICD-9-CM: 250.00 Unspecified essential hypertension     ICD-10-CM: I10 
ICD-9-CM: 401.9 Pure hypercholesterolemia     ICD-10-CM: E78.00 ICD-9-CM: 272.0 Vitals BP Pulse Temp Weight(growth percentile) BMI OB Status 150/63 (BP 1 Location: Left arm, BP Patient Position: Sitting) 76 98.2 °F (36.8 °C) (Oral) 241 lb (109.3 kg) 42.02 kg/m2 Postmenopausal  
 Smoking Status Former Smoker Vitals History BMI and BSA Data Body Mass Index Body Surface Area 42.02 kg/m 2 2.21 m 2 Preferred Pharmacy Pharmacy Name Phone [facility] [address] [place] [address], [address] [place] [address],[address] [phone] Your Updated Medication List  
  
   
This list is accurate as of: 5/23/17  2:17 PM.  Always use your most recent med list.  
  
  
  
  
 lisinopril 10 mg tablet Commonly known as:  Juarez Leys Take 1 Tab by mouth daily. lovastatin 20 mg tablet Commonly known as:  MEVACOR Take 2 Tabs by mouth nightly. metFORMIN  mg tablet Commonly known as:  GLUCOPHAGE XR  
TAKE TWO TABLETS BY MOUTH TWICE DAILY Prescriptions Sent to Pharmacy Refills  
 lisinopril (PRINIVIL, ZESTRIL) 10 mg tablet 1 Sig: Take 1 Tab by mouth daily. Class: Normal  
 Pharmacy: [address], [address] [place] [address],[address] Floor Ph #: [phone] Route: Oral  
 lovastatin (MEVACOR) 20 mg tablet 1 Sig: Take 2 Tabs by mouth nightly. Class: Normal  
 Pharmacy: [address], [address] [place] [address],[address] Floor Ph #: [phone] Route: Oral  
  
We Performed the Following AMB POC GLUCOSE BLOOD, BY GLUCOSE MONITORING DEVICE [59318 CPT(R)] Follow-up Instructions Return in about 3 months (around 8/23/2017). [facility]! Janet Joyner introduces Hot Dot patient portal. Now you can access parts of your medical record, email your doctor's office, and request medication refills online. 1. In your internet browser, go to [email or web address] 2. Click on the First Time User? Click Here link in the Sign In box. You will see the New Member Sign Up page. 3. Enter your Hot Dot Access Code exactly as it appears below. You will not need to use this code after youve completed the sign-up process. If you do not sign up before the expiration date, you must request a new code. · Hot Dot Access Code: [email or web address] Expires: 8/21/2017  2:17 PM 
 
 4. Enter the last four digits of your Social Security Number (xxxx) and Date of Birth (mm/dd/yyyy) as indicated and click Submit. You will be taken to the next sign-up page. 5. Create a DiscountDoc ID. This will be your DiscountDoc login ID and cannot be changed, so think of one that is secure and easy to remember. 6. Create a DiscountDoc password. You can change your password at any time. 7. Enter your Password Reset Question and Answer. This can be used at a later time if you forget your password. 8. Enter your e-mail address. You will receive e-mail notification when new information is available in [address] 9. Click Sign Up. You can now view and download portions of your medical record. 10. Click the Download Summary menu link to download a portable copy of your medical information. If you have questions, please visit the Frequently Asked Questions section of the DiscountDoc website. Remember, DiscountDoc is NOT to be used for urgent needs. For medical emergencies, dial 911. Now available from your iPhone and Android! Please provide this summary of care documentation to your next provider. Your primary care clinician is listed as Maryellen Stiles. If you have any questions after today's visit, please call [phone].

## 2017-08-23 ENCOUNTER — OFFICE VISIT (OUTPATIENT)
Dept: FAMILY MEDICINE CLINIC | Age: 62
End: 2017-08-23

## 2017-08-23 ENCOUNTER — HOSPITAL ENCOUNTER (OUTPATIENT)
Dept: LAB | Age: 62
Discharge: HOME | End: 2017-08-23

## 2017-08-23 VITALS
WEIGHT: 241 LBS | DIASTOLIC BLOOD PRESSURE: 66 MMHG | BODY MASS INDEX: 42.02 KG/M2 | SYSTOLIC BLOOD PRESSURE: 139 MMHG | HEART RATE: 69 BPM | TEMPERATURE: 98.1 F

## 2017-08-23 DIAGNOSIS — R25.2 CRAMPS OF LOWER EXTREMITY, UNSPECIFIED LATERALITY: ICD-10-CM

## 2017-08-23 DIAGNOSIS — E78.00 PURE HYPERCHOLESTEROLEMIA: ICD-10-CM

## 2017-08-23 DIAGNOSIS — E11.9 CONTROLLED TYPE 2 DIABETES MELLITUS WITHOUT COMPLICATION, WITHOUT LONG-TERM CURRENT USE OF INSULIN (HCC): Primary | ICD-10-CM

## 2017-08-23 DIAGNOSIS — E11.9 CONTROLLED TYPE 2 DIABETES MELLITUS WITHOUT COMPLICATION, WITHOUT LONG-TERM CURRENT USE OF INSULIN (HCC): ICD-10-CM

## 2017-08-23 DIAGNOSIS — I10 ESSENTIAL HYPERTENSION: ICD-10-CM

## 2017-08-23 LAB
ANION GAP SERPL CALC-SCNC: 6 MMOL/L (ref 5–15)
BUN SERPL-MCNC: 12 MG/DL (ref 6–20)
BUN/CREAT SERPL: 16 (ref 12–20)
CALCIUM SERPL-MCNC: 9.3 MG/DL (ref 8.5–10.1)
CHLORIDE SERPL-SCNC: 104 MMOL/L (ref 97–108)
CO2 SERPL-SCNC: 29 MMOL/L (ref 21–32)
CREAT SERPL-MCNC: 0.76 MG/DL (ref 0.55–1.02)
EST. AVERAGE GLUCOSE BLD GHB EST-MCNC: 143 MG/DL
GLUCOSE POC: NORMAL MG/DL
GLUCOSE SERPL-MCNC: 105 MG/DL (ref 65–100)
HBA1C MFR BLD: 6.6 % (ref 4.2–6.3)
POTASSIUM SERPL-SCNC: 4.9 MMOL/L (ref 3.5–5.1)
SODIUM SERPL-SCNC: 139 MMOL/L (ref 136–145)

## 2017-08-23 PROCEDURE — 83036 HEMOGLOBIN GLYCOSYLATED A1C: CPT

## 2017-08-23 PROCEDURE — 80048 BASIC METABOLIC PNL TOTAL CA: CPT

## 2017-08-23 NOTE — MR AVS SNAPSHOT
Visit Information Date & Time Provider Department Dept. Phone Encounter #  
 8/23/2017  2:35 PM Fabby Kahn, [facility] [phone] [id number] Follow-up Instructions Return in about 3 months (around 11/23/2017). Upcoming Health Maintenance Date Due Hepatitis C Screening 1955 DTaP/Tdap/Td series (1 - Tdap) 12/27/1975 FOBT Q 1 YEAR AGE 50-75 12/27/2004 ZOSTER VACCINE AGE 60> 10/27/2014 HEMOGLOBIN A1C Q6M 7/24/2017 INFLUENZA AGE 9 TO ADULT 8/1/2017 FOOT EXAM Q1 9/29/2017 MICROALBUMIN Q1 9/29/2017 LIPID PANEL Q1 1/24/2018 EYE EXAM RETINAL OR DILATED Q1 5/15/2018 BREAST CANCER SCRN MAMMOGRAM 4/8/2019 PAP AKA CERVICAL CYTOLOGY 4/18/2019 Allergies as of 8/23/2017  Review Complete On: 8/23/2017 By: Fabby Kahn MD  
  
 Severity Noted Reaction Type Reactions Penicillins Low 05/16/2013    Unknown (comments) As child. Current Immunizations  Reviewed on 1/24/2017 Name Date Influenza Vaccine 10/2/2016 Pneumococcal Polysaccharide (PPSV-23) 1/24/2017 Not reviewed this visit You Were Diagnosed With   
  
 Codes Comments Controlled type 2 diabetes mellitus without complication, without long-term current use of insulin ([facility]    -  Primary ICD-10-CM: E11.9 ICD-9-CM: 250.00 Essential hypertension     ICD-10-CM: I10 
ICD-9-CM: 401.9 Pure hypercholesterolemia     ICD-10-CM: E78.00 ICD-9-CM: 272.0 Cramps of lower extremity, unspecified laterality     ICD-10-CM: R25.2 ICD-9-CM: 729.82 Vitals BP Pulse Temp Weight(growth percentile) BMI OB Status 139/66 (BP 1 Location: Left arm, BP Patient Position: Sitting) 69 98.1 °F (36.7 °C) (Oral) 241 lb (109.3 kg) 42.02 kg/m2 Postmenopausal  
 Smoking Status Former Smoker Vitals History BMI and BSA Data Body Mass Index Body Surface Area 42.02 kg/m 2 2.21 m 2 Preferred Pharmacy Pharmacy Name Phone [facility] [address] [facility], [address] [place] [address],[address] [phone] Your Updated Medication List  
  
   
This list is accurate as of: 8/23/17  3:14 PM.  Always use your most recent med list.  
  
  
  
  
 lisinopril 10 mg tablet Commonly known as:  Quinn Matthew Take 1 Tab by mouth daily. lovastatin 20 mg tablet Commonly known as:  MEVACOR Take 2 Tabs by mouth nightly. metFORMIN  mg tablet Commonly known as:  GLUCOPHAGE XR  
TAKE TWO TABLETS BY MOUTH TWICE DAILY We Performed the Following AMB POC GLUCOSE BLOOD, BY GLUCOSE MONITORING DEVICE [56135 CPT(R)] Follow-up Instructions Return in about 3 months (around 11/23/2017). Introducing [facility]! Ronan Noble introduces DHgate patient portal. Now you can access parts of your medical record, email your doctor's office, and request medication refills online. 1. In your internet browser, go to [email or web address] 2. Click on the First Time User? Click Here link in the Sign In box. You will see the New Member Sign Up page. 3. Enter your DHgate Access Code exactly as it appears below. You will not need to use this code after youve completed the sign-up process. If you do not sign up before the expiration date, you must request a new code. · DHgate Access Code: [email or web address] Expires: 11/21/2017  3:13 PM 
 
4. Enter the last four digits of your Social Security Number (xxxx) and Date of Birth (mm/dd/yyyy) as indicated and click Submit. You will be taken to the next sign-up page. 5. Create a DHgate ID. This will be your DHgate login ID and cannot be changed, so think of one that is secure and easy to remember. 6. Create a DHgate password. You can change your password at any time. 7. Enter your Password Reset Question and Answer. This can be used at a later time if you forget your password. 8. Enter your e-mail address. You will receive e-mail notification when new information is available in [address] 9. Click Sign Up. You can now view and download portions of your medical record. 10. Click the Download Summary menu link to download a portable copy of your medical information. If you have questions, please visit the Frequently Asked Questions section of the M[employer]M Solution website. Remember, M2M Solution is NOT to be used for urgent needs. For medical emergencies, dial 911. Now available from your iPhone and Android! Please provide this summary of care documentation to your next provider. Your primary care clinician is listed as Jeison Saleh. If you have any questions after today's visit, please call [phone].

## 2017-08-23 NOTE — PROGRESS NOTES
Subjective:     Giovanna Trevino is a 57 y.o. female who presents for follow up of diabetes, hypertension and hyperlipidemia. Is in this afternoon with her \"2nd daughter\". Other daughter moved in with her sister and left 2 cats with her mother. She is sneezing more she thinks from this. Still having back and leg pain about the same. Patient Active Problem List   Diagnosis Code    Chronic lower back pain M54.5, G89.29    Controlled diabetes mellitus type II without complication ([facility]) D19.0    Pure hypercholesterolemia E78.00    Essential hypertension I10       Current Outpatient Prescriptions   Medication Sig    lisinopril (PRINIVIL, ZESTRIL) 10 mg tablet Take 1 Tab by mouth daily.  lovastatin (MEVACOR) 20 mg tablet Take 2 Tabs by mouth nightly.  metFORMIN ER (GLUCOPHAGE XR) 500 mg tablet TAKE TWO TABLETS BY MOUTH TWICE DAILY     No current facility-administered medications for this visit. Allergies   Allergen Reactions    Penicillins Unknown (comments)     As child. Diet and Lifestyle: follows a diabetic diet regularly    From her detailed glucose log:  AM glucoses:  138 - 149  Post Prandial glucoses:  92 - 131  Evening glucoses:  159 - 161    BP readings outside office: All WNL. Cardiovascular ROS: taking medications as instructed, no medication side effects noted, no TIA's, no chest pain on exertion, no dyspnea on exertion, no swelling of ankles. Review of Systems, additional:  Pertinent items are noted in HPI. No past surgical history on file. Family History   Problem Relation Age of Onset    Diabetes Mother     Kidney Disease Mother      polycystic kidney disease    Asthma Mother     Hypertension Mother     Heart Disease Mother     Diabetes Sister     Kidney Disease Sister      polycystic kidney disease    Heart Disease Maternal Grandmother     Hypertension Maternal Grandmother     Heart Disease Maternal Grandfather     Cancer Father      ?     Heart Disease Father     Hypertension Father      Social History   Substance Use Topics    Smoking status: Former Smoker     Packs/day: 0.50     Years: 16.00     Types: Cigarettes    Smokeless tobacco: Not on file      Comment: almost 30 years ago quit    Alcohol use No          Objective:     Visit Vitals    /66 (BP 1 Location: Left arm, BP Patient Position: Sitting)    Pulse 69    Temp 98.1 °F (36.7 °C) (Oral)    Wt 241 lb (109.3 kg)    BMI 42.02 kg/m2     Body mass index is 42.02 kg/(m^2). Physical Exam  General appearance: alert, cooperative, no distress, appears stated age    Lungs: clear to auscultation bilaterally, no wheezes, no increased work of breathing  Heart: regular rate and rhythm, S1, S2 normal, no murmur, click, rub or gallop  Extremities: extremities normal, no cyanosis or edema, non tender.   Skin: color, texture, turgor normal. No rashes or lesions  Neurologic: Alert and oriented, grossly normal exam. Normal coordination and gait      Lab results below reviewed at this visit:  Results for orders placed or performed in visit on 08/23/17   AMB POC GLUCOSE BLOOD, BY GLUCOSE MONITORING DEVICE   Result Value Ref Range    Glucose  fasting mg/dL       Lab Results   Component Value Date/Time    Hemoglobin A1c 6.4 01/24/2017 02:26 PM       Lab Results   Component Value Date/Time    Sodium 139 06/06/2016 03:48 PM    Potassium 3.8 06/06/2016 03:48 PM    Chloride 106 06/06/2016 03:48 PM    CO2 26 06/06/2016 03:48 PM    Anion gap 7 06/06/2016 03:48 PM    Glucose 224 06/06/2016 03:48 PM    BUN 11 06/06/2016 03:48 PM    Creatinine 0.70 06/06/2016 03:48 PM    BUN/Creatinine ratio 16 06/06/2016 03:48 PM    GFR est AA >60 06/06/2016 03:48 PM    GFR est non-AA >60 06/06/2016 03:48 PM    Calcium 8.4 06/06/2016 03:48 PM       Lab Results   Component Value Date/Time    Microalbumin/Creat ratio (mg/g creat) 6 09/29/2016 03:21 PM    Microalbumin,urine random 0.61 09/29/2016 03:21 PM       Lab Results   Component Value Date/Time    Cholesterol, total 144 01/24/2017 02:26 PM    HDL Cholesterol 57 01/24/2017 02:26 PM    LDL, calculated 59.8 01/24/2017 02:26 PM    VLDL, calculated 27.2 01/24/2017 02:26 PM    Triglyceride 136 01/24/2017 02:26 PM    CHOL/HDL Ratio 2.5 01/24/2017 02:26 PM         Assessment/Plan:       ICD-10-CM ICD-9-CM    1. Controlled type 2 diabetes mellitus without complication, without long-term current use of insulin (HCC) E11.9 250.00 AMB POC GLUCOSE BLOOD, BY GLUCOSE MONITORING DEVICE      METABOLIC PANEL, BASIC      HEMOGLOBIN A1C WITH EAG   2. Essential hypertension I10 401.9    3. Pure hypercholesterolemia E78.00 272.0    4. Cramps of lower extremity, unspecified laterality R25.2 729.82        Discussed keeping cats out of bedroom. Try OTC antihistamine. Her diabetes seems well controlled. I informed her that she did not need to check her glucose quite as often as she has been. Continue same medications. Blood pressure seems to be well controlled. Lipids are at goal.    Discussed stretching of the legs before going to bed to reduce her leg cramps. Consider adding OTC magnesium if needed. Check lab work. Follow-up Disposition:  Return in about 3 months (around 11/23/2017).

## 2017-08-23 NOTE — PROGRESS NOTES
Coordination of Care  1. Have you been to the ER, urgent care clinic since your last visit? Hospitalized since your last visit? No    2. Have you seen or consulted any other health care providers outside of the [address] [place] [address] since your last visit? Include any pap smears or colon screening.  No    Medications  Medication Reconciliation Performed: yes  Patient does not need refills     Learning Assessment Complete? yes  Results for orders placed or performed in visit on 08/23/17   AMB POC GLUCOSE BLOOD, BY GLUCOSE MONITORING DEVICE   Result Value Ref Range    Glucose  fasting mg/dL

## 2017-11-28 ENCOUNTER — HOSPITAL ENCOUNTER (OUTPATIENT)
Dept: LAB | Age: 62
Discharge: HOME | End: 2017-11-28

## 2017-11-28 ENCOUNTER — OFFICE VISIT (OUTPATIENT)
Dept: FAMILY MEDICINE CLINIC | Age: 62
End: 2017-11-28

## 2017-11-28 VITALS
SYSTOLIC BLOOD PRESSURE: 146 MMHG | BODY MASS INDEX: 42.2 KG/M2 | WEIGHT: 242 LBS | DIASTOLIC BLOOD PRESSURE: 77 MMHG | TEMPERATURE: 98.3 F | HEART RATE: 69 BPM

## 2017-11-28 DIAGNOSIS — M54.50 MIDLINE LOW BACK PAIN WITHOUT SCIATICA, UNSPECIFIED CHRONICITY: ICD-10-CM

## 2017-11-28 DIAGNOSIS — E78.00 PURE HYPERCHOLESTEROLEMIA: ICD-10-CM

## 2017-11-28 DIAGNOSIS — I10 ESSENTIAL HYPERTENSION: ICD-10-CM

## 2017-11-28 DIAGNOSIS — Z23 ENCOUNTER FOR IMMUNIZATION: ICD-10-CM

## 2017-11-28 DIAGNOSIS — E11.9 CONTROLLED TYPE 2 DIABETES MELLITUS WITHOUT COMPLICATION, WITHOUT LONG-TERM CURRENT USE OF INSULIN (HCC): Primary | ICD-10-CM

## 2017-11-28 DIAGNOSIS — E11.9 CONTROLLED TYPE 2 DIABETES MELLITUS WITHOUT COMPLICATION, WITHOUT LONG-TERM CURRENT USE OF INSULIN (HCC): ICD-10-CM

## 2017-11-28 LAB
ANION GAP SERPL CALC-SCNC: 6 MMOL/L (ref 5–15)
BUN SERPL-MCNC: 9 MG/DL (ref 6–20)
BUN/CREAT SERPL: 12 (ref 12–20)
CALCIUM SERPL-MCNC: 9.6 MG/DL (ref 8.5–10.1)
CHLORIDE SERPL-SCNC: 106 MMOL/L (ref 97–108)
CO2 SERPL-SCNC: 28 MMOL/L (ref 21–32)
CREAT SERPL-MCNC: 0.77 MG/DL (ref 0.55–1.02)
CREAT UR-MCNC: 104 MG/DL
EST. AVERAGE GLUCOSE BLD GHB EST-MCNC: 137 MG/DL
GLUCOSE POC: NORMAL MG/DL
GLUCOSE SERPL-MCNC: 110 MG/DL (ref 65–100)
HBA1C MFR BLD: 6.4 % (ref 4.2–6.3)
MICROALBUMIN UR-MCNC: 7.46 MG/DL
MICROALBUMIN/CREAT UR-RTO: 72 MG/G (ref 0–30)
POTASSIUM SERPL-SCNC: 4.2 MMOL/L (ref 3.5–5.1)
SODIUM SERPL-SCNC: 140 MMOL/L (ref 136–145)

## 2017-11-28 PROCEDURE — 82043 UR ALBUMIN QUANTITATIVE: CPT | Performed by: FAMILY MEDICINE

## 2017-11-28 PROCEDURE — 83036 HEMOGLOBIN GLYCOSYLATED A1C: CPT | Performed by: FAMILY MEDICINE

## 2017-11-28 PROCEDURE — 80048 BASIC METABOLIC PNL TOTAL CA: CPT | Performed by: FAMILY MEDICINE

## 2017-11-28 RX ORDER — DULOXETIN HYDROCHLORIDE 30 MG/1
30 CAPSULE, DELAYED RELEASE ORAL DAILY
Qty: 60 CAP | Refills: 1 | Status: SHIPPED | OUTPATIENT
Start: 2017-11-28 | End: 2017-12-04 | Stop reason: SDUPTHER

## 2017-11-28 RX ORDER — LISINOPRIL 10 MG/1
10 TABLET ORAL DAILY
Qty: 90 TAB | Refills: 1 | Status: SHIPPED | OUTPATIENT
Start: 2017-11-28 | End: 2018-05-15 | Stop reason: SDUPTHER

## 2017-11-28 RX ORDER — LOVASTATIN 20 MG/1
40 TABLET ORAL
Qty: 180 TAB | Refills: 1 | Status: SHIPPED | OUTPATIENT
Start: 2017-11-28 | End: 2018-05-15 | Stop reason: SDUPTHER

## 2017-11-28 RX ORDER — METFORMIN HYDROCHLORIDE 500 MG/1
TABLET, EXTENDED RELEASE ORAL
Qty: 360 TAB | Refills: 1 | Status: SHIPPED | OUTPATIENT
Start: 2017-11-28 | End: 2018-05-15 | Stop reason: SDUPTHER

## 2017-11-28 NOTE — MR AVS SNAPSHOT
Visit Information Date & Time Provider Department Dept. Phone Encounter #  
 11/28/2017 10:10 AM Silas Rodríguez [phone] [id number] Upcoming Health Maintenance Date Due Hepatitis C Screening 1955 DTaP/Tdap/Td series (1 - Tdap) 12/27/1975 FOBT Q 1 YEAR AGE 50-75 12/27/2004 ZOSTER VACCINE AGE 60> 10/27/2014 Influenza Age 5 to Adult 8/1/2017 FOOT EXAM Q1 9/29/2017 MICROALBUMIN Q1 9/29/2017 LIPID PANEL Q1 1/24/2018 HEMOGLOBIN A1C Q6M 2/23/2018 EYE EXAM RETINAL OR DILATED Q1 5/15/2018 BREAST CANCER SCRN MAMMOGRAM 4/8/2019 PAP AKA CERVICAL CYTOLOGY 4/18/2019 Allergies as of 11/28/2017  Review Complete On: 11/28/2017 By: Rupert Smyth Severity Noted Reaction Type Reactions Penicillins Low 05/16/2013    Unknown (comments) As child. Current Immunizations  Reviewed on 1/24/2017 Name Date Influenza Vaccine 10/2/2016 Pneumococcal Polysaccharide (PPSV-23) 1/24/2017 Not reviewed this visit You Were Diagnosed With   
  
 Codes Comments Controlled type 2 diabetes mellitus without complication, without long-term current use of insulin ([facility] [address]    -  Primary ICD-10-CM: E11.9 ICD-9-CM: 250.00 Essential hypertension     ICD-10-CM: I10 
ICD-9-CM: 401.9 Pure hypercholesterolemia     ICD-10-CM: E78.00 ICD-9-CM: 272.0 Vitals BP Pulse Temp Weight(growth percentile) BMI OB Status 146/77 (BP 1 Location: Left arm, BP Patient Position: Sitting) 69 98.3 °F (36.8 °C) (Oral) 242 lb (109.8 kg) 42.2 kg/m2 Postmenopausal  
 Smoking Status Former Smoker Vitals History BMI and BSA Data Body Mass Index Body Surface Area  
 42.2 kg/m 2 2.22 m 2 Preferred Pharmacy Pharmacy Name Phone [facility] [address] [facility], [address] [place] [address],[facility] Floor [phone] Your Updated Medication List  
  
   
 This list is accurate as of: 11/28/17 11:11 AM.  Always use your most recent med list.  
  
  
  
  
 DULoxetine 30 mg capsule Commonly known as:  CYMBALTA Take 1 Cap by mouth daily. For 1 week then 2 daily. lisinopril 10 mg tablet Commonly known as:  Anna Hu Take 1 Tab by mouth daily. lovastatin 20 mg tablet Commonly known as:  MEVACOR Take 2 Tabs by mouth nightly. metFORMIN  mg tablet Commonly known as:  GLUCOPHAGE XR  
TAKE TWO TABLETS BY MOUTH TWICE DAILY Prescriptions Sent to Pharmacy Refills  
 lisinopril (PRINIVIL, ZESTRIL) 10 mg tablet 1 Sig: Take 1 Tab by mouth daily. Class: Normal  
 Pharmacy: [facility] [place] [address], [address] [place] [address],[address] [place] [address] Ph #: [phone] Route: Oral  
 metFORMIN ER (GLUCOPHAGE XR) 500 mg tablet 1 Sig: TAKE TWO TABLETS BY MOUTH TWICE DAILY Class: Normal  
 Pharmacy: [facility] [place] [address] Ph #: [phone]  
 lovastatin (MEVACOR) 20 mg tablet 1 Sig: Take 2 Tabs by mouth nightly. Class: Normal  
 Pharmacy: [facility] [place] [address], [address] [place] [address],[address] [place] [address] Ph #: [phone] Route: Oral  
 DULoxetine (CYMBALTA) 30 mg capsule 1 Sig: Take 1 Cap by mouth daily. For 1 week then 2 daily. Class: Normal  
 Pharmacy: [facility] [place] [address] Ph #: [phone] Route: Oral  
  
We Performed the Following AMB POC GLUCOSE BLOOD, BY GLUCOSE MONITORING DEVICE [61073 CPT(R)] To-Do List   
 11/28/2017 Lab:  HEMOGLOBIN A1C WITH EAG   
  
 11/28/2017 Lab:  METABOLIC PANEL, BASIC   
  
 11/28/2017 Lab:  MICROALBUMIN, UR, RAND W/ MICROALBUMIN/CREA RATIO   
  
 12/28/2017 Lab:  OCCULT BLOOD, IMMUNOASSAY (FIT) Introducing [facility]!    
 [facility] Si[facility] introduces [employer] patient portal. Now you can access parts of your medical record, email your doctor's office, and request medication refills online. 1. In your internet browser, go to [email or web address] 2. Click on the First Time User? Click Here link in the Sign In box. You will see the New Member Sign Up page. 3. Enter your [employer] Access Code exactly as it appears below. You will not need to use this code after youve completed the sign-up process. If you do not sign up before the expiration date, you must request a new code. · [employer] Access Code: [email or web address] Expires: 2/26/2018 11:11 AM 
 
4. Enter the last four digits of your Social Security Number (xxxx) and Date of Birth (mm/dd/yyyy) as indicated and click Submit. You will be taken to the next sign-up page. 5. Create a [employer] ID. This will be your [employer] login ID and cannot be changed, so think of one that is secure and easy to remember. 6. Create a [employer] password. You can change your password at any time. 7. Enter your Password Reset Question and Answer. This can be used at a later time if you forget your password. 8. Enter your e-mail address. You will receive e-mail notification when new information is available in [address] 9. Click Sign Up. You can now view and download portions of your medical record. 10. Click the Download Summary menu link to download a portable copy of your medical information. If you have questions, please visit the Frequently Asked Questions section of the [employer] website. Remember, [employer] is NOT to be used for urgent needs. For medical emergencies, dial 911. Now available from your iPhone and Android! Please provide this summary of care documentation to your next provider. Your primary care clinician is listed as Hubub Docker. If you have any questions after today's visit, please call [phone].

## 2017-11-28 NOTE — PROGRESS NOTES
Subjective:     Vidal Valenzuela is a 57 y.o. female seen for follow up of diabetes. She also has hypertension and hyperlipidemia. Diabetic Review of Systems - medication compliance: compliant most of the time. Other symptoms and concerns: continued mid-low back chronic pain. Neighbor came in with her today to make sure she tells me about the pain and about how much pain med she is taking, which is about 800mg ibuprofen or 1500mg aleve daily. Reports she saw Dr. Gokul Villa once several years ago, avoids exercise because it makes her back hurt, hasn't tried other meds, hasn't done PT. Deidre Riser     Patient Active Problem List   Diagnosis Code    Chronic lower back pain M54.5, G89.29    Controlled diabetes mellitus type II without complication (HCC) Z20.1    Pure hypercholesterolemia E78.00    Essential hypertension I10     Social History   Substance Use Topics    Smoking status: Former Smoker     Packs/day: 0.50     Years: 16.00     Types: Cigarettes    Smokeless tobacco: Never Used      Comment: almost 30 years ago quit    Alcohol use No        Lab Results  Component Value Date/Time   GFR est non-AA >60 08/23/2017 03:13 PM   GFR est AA >60 08/23/2017 03:13 PM   Creatinine 0.76 08/23/2017 03:13 PM   BUN 12 08/23/2017 03:13 PM   Sodium 139 08/23/2017 03:13 PM   Potassium 4.9 08/23/2017 03:13 PM   Chloride 104 08/23/2017 03:13 PM   CO2 29 08/23/2017 03:13 PM        Lab Results   Component Value Date/Time    Hemoglobin A1c 6.6 08/23/2017 03:13 PM    Hemoglobin A1c 6.4 01/24/2017 02:26 PM    Hemoglobin A1c 9.2 07/20/2016 02:35 PM         Objective:     Vitals 11/28/2017 8/23/2017 5/23/2017 4/8/2017 2/21/2017 1/24/2017 1/24/2017   Blood Pressure 146/77 139/66 150/63 148/80 135/58 146/67 157/78   Pulse 69 69 76 - 78 77 80   Temp 98.3 98.1 98.2 - 98.6 - 98.4   Resp - - - - - - -   Height - - - - - - -   Weight 242 lb 241 lb 241 lb - 234 lb - 237 lb   SpO2 - - - - - - -   BSA - - - - - - -   BMI - - - - - - -   BP comment - - - - - - -     Appearance: alert, well appearing, and in no distress and overweight. Has gained about 12 lb over the last year. Exam: heart sounds normal rate, regular rhythm, normal S1, S2, no murmurs, rubs, clicks or gallops, chest clear, no hepatosplenomegaly, no carotid bruits, feet: warm, good capillary refill and normal DP and PT pulses  Lab review:    Assessment/Plan:     diabetes stable, hyperlipidemai well controlled. .  htn-- BP is mildly elevated. Last home check in 8/17 was 106/60s. To check a few at home before the next visit. Check A1C. Overuse of nsaids-- check microalb and bmp. No more than 400mg advil bid. Chronic mid-low back pain. Start cymbalta 30mg daily for 1 week, then increase to 60mg daily and f/u 6 weeks. Try lidocaine patch or icy hot with lidocaine. Gave her 2 exercises for pain and discussed pillow use to rest back and at night. Recommended she work with Dr. Rustam Jules again or go to PT through [facility]. ICD-10-CM ICD-9-CM    1. Controlled type 2 diabetes mellitus without complication, without long-term current use of insulin (HCC) E11.9 250.00 AMB POC GLUCOSE BLOOD, BY GLUCOSE MONITORING DEVICE      METABOLIC PANEL, BASIC      HEMOGLOBIN A1C WITH EAG      OCCULT BLOOD, IMMUNOASSAY (FIT)      MICROALBUMIN, UR, RAND W/ MICROALBUMIN/CREA RATIO   2. Essential hypertension I10 401.9 lisinopril (PRINIVIL, ZESTRIL) 10 mg tablet      METABOLIC PANEL, BASIC      HEMOGLOBIN A1C WITH EAG      OCCULT BLOOD, IMMUNOASSAY (FIT)      MICROALBUMIN, UR, RAND W/ MICROALBUMIN/CREA RATIO   3. Pure hypercholesterolemia E78.00 272.0 lovastatin (MEVACOR) 20 mg tablet      METABOLIC PANEL, BASIC      HEMOGLOBIN A1C WITH EAG      OCCULT BLOOD, IMMUNOASSAY (FIT)      MICROALBUMIN, UR, RAND W/ MICROALBUMIN/CREA RATIO   4.  Encounter for immunization Z23 V03.89 TETANUS, DIPHTHERIA TOXOIDS AND ACELLULAR PERTUSSIS VACCINE (TDAP), IN INDIVIDS. >=7, IM

## 2017-11-28 NOTE — PROGRESS NOTES
Coordination of Care  1. Have you been to the ER, urgent care clinic since your last visit? Hospitalized since your last visit? No    2. Have you seen or consulted any other health care providers outside of the [address] [place] [address] since your last visit? Include any pap smears or colon screening. No    Medications  Does the patient need refills?  YES    Learning Assessment Complete? yes  Results for orders placed or performed in visit on 11/28/17   AMB POC GLUCOSE BLOOD, BY GLUCOSE MONITORING DEVICE   Result Value Ref Range    Glucose  fasting mg/dL

## 2017-11-28 NOTE — PROGRESS NOTES
Pt  completed screening form. No contraindications noted for administering vaccines. Vaccines given per policy. Reviewed possible adverse reaction to vaccines and  Vaccine Information Sheet given to pt in English. Pt received IM Tdap today  No adverse reaction noted at the time of discharge.

## 2017-11-29 NOTE — PROGRESS NOTES
Kidney tests in the blood look ok but urine has more protein that it did, which is a sign of kidney stress, could be from all of the ibuprofen/aleve. Again, try not to take more than two 200mg ibuprofen bid, or 1 aleve bid. I notice that she is seeing Dr. Romel Simmons and I think that is a good plan. May need more support to help the back pain in non-pill ways and if so, should schedule to see one of us when needed. Reinforce that the Cymbalta may take up to a few weeks just to start working, and up to 6 weeks for full effect. Thanks.

## 2017-12-04 ENCOUNTER — TELEPHONE (OUTPATIENT)
Dept: FAMILY MEDICINE CLINIC | Age: 62
End: 2017-12-04

## 2017-12-04 RX ORDER — DULOXETIN HYDROCHLORIDE 30 MG/1
30 CAPSULE, DELAYED RELEASE ORAL DAILY
Qty: 60 CAP | Refills: 1 | Status: SHIPPED | OUTPATIENT
Start: 2017-12-04 | End: 2018-03-01 | Stop reason: SINTOL

## 2017-12-04 NOTE — TELEPHONE ENCOUNTER
A message was left on the [employer] by Ho Ferreira, a friend of the patient and who is not on the [address] form, that the patient was supposed to have a prescription sent to [facility] and they have been there twice to pick it up and told both times that there was no prescription for the patient. Return call made to the patient to clarify. She stated that the Cymbalta prescription was supposed to be sent to [facility] rather than [facility] where her other 3 prescriptions were sent. She can not afford the [facility] price (about $80) for the Cymbalta. Per chart review, her Cymbalta prescription was sent to [facility]. Confirmed on the [facility] website that it will caost much less at [facility].  Transferred the Cymbalta prescription to the [address] [place] [address] per the patient's request. Jd Horta RN

## 2017-12-11 LAB — HEMOCCULT STL QL IA: NEGATIVE

## 2018-03-01 ENCOUNTER — HOSPITAL ENCOUNTER (OUTPATIENT)
Dept: LAB | Age: 63
Discharge: HOME | End: 2018-03-01

## 2018-03-01 ENCOUNTER — OFFICE VISIT (OUTPATIENT)
Dept: FAMILY MEDICINE CLINIC | Age: 63
End: 2018-03-01

## 2018-03-01 VITALS
SYSTOLIC BLOOD PRESSURE: 152 MMHG | HEART RATE: 74 BPM | TEMPERATURE: 97.8 F | DIASTOLIC BLOOD PRESSURE: 80 MMHG | BODY MASS INDEX: 42.81 KG/M2 | HEIGHT: 63 IN | WEIGHT: 241.6 LBS

## 2018-03-01 DIAGNOSIS — R73.9 ELEVATED BLOOD SUGAR: Primary | ICD-10-CM

## 2018-03-01 DIAGNOSIS — R73.9 ELEVATED BLOOD SUGAR: ICD-10-CM

## 2018-03-01 LAB
CREAT UR-MCNC: 51.2 MG/DL
GLUCOSE POC: NORMAL MG/DL
MICROALBUMIN UR-MCNC: <0.5 MG/DL
MICROALBUMIN/CREAT UR-RTO: NORMAL MG/G (ref 0–30)

## 2018-03-01 PROCEDURE — 82043 UR ALBUMIN QUANTITATIVE: CPT | Performed by: FAMILY MEDICINE

## 2018-03-01 RX ORDER — GABAPENTIN 100 MG/1
100 CAPSULE ORAL 3 TIMES DAILY
Qty: 90 CAP | Refills: 2 | Status: SHIPPED | OUTPATIENT
Start: 2018-03-01 | End: 2018-04-19 | Stop reason: SDUPTHER

## 2018-03-01 RX ORDER — GABAPENTIN 100 MG/1
100 CAPSULE ORAL 3 TIMES DAILY
Qty: 90 CAP | Refills: 2 | Status: SHIPPED | OUTPATIENT
Start: 2018-03-01 | End: 2018-03-01 | Stop reason: SDUPTHER

## 2018-03-01 NOTE — PROGRESS NOTES
Subjective:     Lou Cool is a 60 y.o. female seen for follow up of diabetes. She also has hypertension and hyperlipidemia. Diabetic Review of Systems - medication compliance: compliant all of the time. Other symptoms and concerns: lbp is still present. Sessions with dr. Naty Villalobos are helping and she is down to 2 advil tid. She got very dizzy on the cymbalta and stopped it after a few days. .    Patient Active Problem List   Diagnosis Code    Chronic lower back pain M54.5, G89.29    Controlled diabetes mellitus type II without complication (HCC) D09.2    Pure hypercholesterolemia E78.00    Essential hypertension I10     Family History   Problem Relation Age of Onset    Diabetes Mother     Kidney Disease Mother      polycystic kidney disease    Asthma Mother     Hypertension Mother     Heart Disease Mother     Diabetes Sister     Kidney Disease Sister      polycystic kidney disease    Heart Disease Maternal Grandmother     Hypertension Maternal Grandmother     Heart Disease Maternal Grandfather     Cancer Father      ?     Heart Disease Father     Hypertension Father      Social History   Substance Use Topics    Smoking status: Former Smoker     Packs/day: 0.50     Years: 16.00     Types: Cigarettes    Smokeless tobacco: Never Used      Comment: almost 30 years ago quit    Alcohol use No        Lab Results  Component Value Date/Time   Cholesterol, total 144 01/24/2017 02:26 PM   HDL Cholesterol 57 01/24/2017 02:26 PM   LDL, calculated 59.8 01/24/2017 02:26 PM   Triglyceride 136 01/24/2017 02:26 PM   CHOL/HDL Ratio 2.5 01/24/2017 02:26 PM     Lab Results  Component Value Date/Time   GFR est non-AA >60 11/28/2017 11:45 AM   GFR est AA >60 11/28/2017 11:45 AM   Creatinine 0.77 11/28/2017 11:45 AM   BUN 9 11/28/2017 11:45 AM   Sodium 140 11/28/2017 11:45 AM   Potassium 4.2 11/28/2017 11:45 AM   Chloride 106 11/28/2017 11:45 AM   CO2 28 11/28/2017 11:45 AM        Lab Results   Component Value Date/Time    Hemoglobin A1c 6.4 (H) 11/28/2017 11:45 AM    Hemoglobin A1c 6.6 (H) 08/23/2017 03:13 PM    Hemoglobin A1c 6.4 (H) 01/24/2017 02:26 PM         Objective:     Vitals 3/1/2018 3/1/2018 11/28/2017 8/23/2017 5/23/2017 4/8/2017 2/21/2017   Blood Pressure 152/80 178/91 146/77 139/66 150/63 148/80 135/58   Pulse 74 83 69 69 76 - 78   Temp - 97.8 98.3 98.1 98.2 - 98.6   Resp - - - - - - -   Height - 5' 3.465\" - - - - -   Weight - 241 lb 9.6 oz 242 lb 241 lb 241 lb - 234 lb   SpO2 - - - - - - -   BSA - 2.22 m2 - - - - -   BMI - 42.17 kg/m2 - - - - -   BP comment - - - - - - -     Appearance: alert, well appearing, and in no distress and overweight. Exam: heart sounds normal rate, regular rhythm, normal S1, S2, no murmurs, rubs, clicks or gallops, chest clear, no carotid bruits, feet: warm, good capillary refill and normal DP and PT pulses  Lab review:    Assessment/Plan:     diabetes well controlled, hypertension borderline controlled, hyperlipidemia well controlled, chronic LBP. Try exercise for bp and wt. Loss. May need increased htn med at next visit. neurontin for lbp. ICD-10-CM ICD-9-CM    1.  Elevated blood sugar R73.9 790.29 AMB POC GLUCOSE BLOOD, BY GLUCOSE MONITORING DEVICE      MICROALBUMIN, UR, RAND W/ MICROALB/CREAT RATIO

## 2018-03-01 NOTE — MR AVS SNAPSHOT
Prem Olvera 
 
 
 [address] [facility] [address] [address] 
[phone] Patient: Kaila Monsalve MRN: [id number] :[id number] Visit Information Date & Time Provider Department Dept. Phone Encounter #  
 3/1/2018 10:30 AM Anita Batista, [address] [facility] [phone] [id number] Follow-up Instructions Return for 2-3 months. Upcoming Health Maintenance Date Due Hepatitis C Screening 1955 ZOSTER VACCINE AGE 60> 10/27/2014 Influenza Age 5 to Adult 2017 FOOT EXAM Q1 2017 LIPID PANEL Q1 2018 EYE EXAM RETINAL OR DILATED Q1 5/15/2018 HEMOGLOBIN A1C Q6M 2018 MICROALBUMIN Q1 2018 FOBT Q 1 YEAR AGE 50-75 2018 BREAST CANCER SCRN MAMMOGRAM 2019 PAP AKA CERVICAL CYTOLOGY 2019 DTaP/Tdap/Td series (2 - Td) 2027 Allergies as of 3/1/2018  Review Complete On: 3/1/2018 By: Severa Buckles Severity Noted Reaction Type Reactions Penicillins Low 2013    Unknown (comments) As child. Current Immunizations  Reviewed on 2017 Name Date Influenza Vaccine 10/2/2016 Pneumococcal Polysaccharide (PPSV-23) 2017 Tdap 2017 Not reviewed this visit You Were Diagnosed With   
  
 Codes Comments Elevated blood sugar    -  Primary ICD-10-CM: R73.9 ICD-9-CM: 790.29 Vitals BP Pulse Temp Height(growth percentile) Weight(growth percentile) BMI  
 152/80 (BP 1 Location: Left arm) 74 97.8 °F (36.6 °C) (Oral) 5' 3.47\" (1.612 m) 241 lb 9.6 oz (109.6 kg) 42.17 kg/m2 OB Status Smoking Status Postmenopausal Former Smoker Vitals History BMI and BSA Data Body Mass Index Body Surface Area  
 42.17 kg/m 2 2.22 m 2 Preferred Pharmacy Pharmacy Name Phone [facility] [address], [address] [phone] Your Updated Medication List  
  
   
 This list is accurate as of 3/1/18 11:03 AM.  Always use your most recent med list.  
  
  
  
  
 gabapentin 100 mg capsule Commonly known as:  NEURONTIN Take 1 Cap by mouth three (3) times daily. For 1 week, then 2 tid for 1 week, then may increase to three tid prn pain. lisinopril 10 mg tablet Commonly known as:  Queen Mildred Take 1 Tab by mouth daily. lovastatin 20 mg tablet Commonly known as:  MEVACOR Take 2 Tabs by mouth nightly. metFORMIN  mg tablet Commonly known as:  GLUCOPHAGE XR  
TAKE TWO TABLETS BY MOUTH TWICE DAILY Prescriptions Sent to Pharmacy Refills  
 gabapentin (NEURONTIN) 100 mg capsule 2 Sig: Take 1 Cap by mouth three (3) times daily. For 1 week, then 2 tid for 1 week, then may increase to three tid prn pain. Class: Normal  
 Pharmacy: [address], [address] Ph #: [phone] Route: Oral  
  
We Performed the Following AMB POC GLUCOSE BLOOD, BY GLUCOSE MONITORING DEVICE [84712 CPT(R)] Follow-up Instructions Return for 2-3 months. To-Do List   
 03/01/2018 Lab:  MICROALBUMIN, UR, RAND W/ MICROALB/CREAT RATIO Introducing [facility]! [facility] introduces [employer] patient portal. Now you can access parts of your medical record, email your doctor's office, and request medication refills online. 1. In your internet browser, go to [email or web address] 2. Click on the First Time User? Click Here link in the Sign In box. You will see the New Member Sign Up page. 3. Enter your [employer] Access Code exactly as it appears below. You will not need to use this code after youve completed the sign-up process. If you do not sign up before the expiration date, you must request a new code. · [employer] Access Code: [email or web address] Expires: 5/30/2018 11:03 AM 
 
4.  Enter the last four digits of your Social Security Number (xxxx) and Date of Birth (mm/dd/yyyy) as indicated and click Submit. You will be taken to the next sign-up page. 5. Create a [employer] ID. This will be your [employer] login ID and cannot be changed, so think of one that is secure and easy to remember. 6. Create a [employer] password. You can change your password at any time. 7. Enter your Password Reset Question and Answer. This can be used at a later time if you forget your password. 8. Enter your e-mail address. You will receive e-mail notification when new information is available in [address] 9. Click Sign Up. You can now view and download portions of your medical record. 10. Click the Download Summary menu link to download a portable copy of your medical information. If you have questions, please visit the Frequently Asked Questions section of the [employer] website. Remember, [employer] is NOT to be used for urgent needs. For medical emergencies, dial 911. Now available from your iPhone and Android! Please provide this summary of care documentation to your next provider. Your primary care clinician is listed as Cornelius Pichardo. If you have any questions after today's visit, please call [phone].

## 2018-03-01 NOTE — PROGRESS NOTES
Coordination of Care  1. Have you been to the ER, urgent care clinic since your last visit? Hospitalized since your last visit? No    2. Have you seen or consulted any other health care providers outside of the [address] [place] [address] since your last visit? Include any pap smears or colon screening. No    Does the patient need refills?  YES    Learning Assessment Complete? yes  Results for orders placed or performed in visit on 03/01/18   AMB POC GLUCOSE BLOOD, BY GLUCOSE MONITORING DEVICE   Result Value Ref Range    Glucose POC fast 136 mg/dL

## 2018-03-23 NOTE — PROGRESS NOTES
Discharge- [address] 1964, 46 y o  male MRN: [id number]    Unit/Bed#: -01 Encounter: [id number]    Primary Care Provider: Vibha Maldonado MD   Date and time admitted to hospital: 3/19/2018  4:53 PM        * Acute pancreatitis   Assessment & Plan    · POA with lipase 678, CT abdomen showed peripancreatic stranding with large lymph node consistent with pancreatitis  · Suspect alcohol-induced however was not drinking recently  No alcohol intake for 3 weeks prior to admission  · No gallstones on ultrasound   · Triglycerides 205  · Lipase improved to 289 on 3/21/18  · Diet advanced to low fat this AM and patient tolerated diet without difficulty  · Outpatient follow-up with GI  · Being discharged on oxycodone 5 mg p o  q8h prn x 5 days  · PDMP website reviewed, no recent prescriptions for patient  HLD (hyperlipidemia)   Assessment & Plan    · Continue statin        HTN (hypertension), benign   Assessment & Plan    · BP acceptable  · Continue ramipril  Lesion of left native kidney   Assessment & Plan    · Not visualized on ultrasound given that US was of right upper quadrant  · Follow-up with PCP and obtain outpatient renal ultrasound        Hepatic steatosis   Assessment & Plan    · Chronic hepatitis panel negative  · Alcohol cessation  · Low-fat diet recommended on discharge  Resolved Problems  Date Reviewed: 3/23/2018    None          Consultations During Hospital Stay:  · Gastroenterology    Procedures Performed:     · None    Significant Findings / Test Results:     · CTA chest, CT abdomen pelvis: No acute pulmonary embolism  Acute pancreatitis  Hepatic steatosis  Indeterminate lesion arising from the lower pole the left kidney  This could possibly represent a proteinaceous/hemorrhagic cyst   Consider initial evaluation with nonemergent renal ultrasound  If this is inconclusive, further evaluation with MRI recommended    · Right upper quadrant ultrasound: [facility]       No Yes Comments   Has a doctor ever seen or felt anything wrong with your breast? [x]                                  []                                     Have you ever had a breast biopsy? [x]                                  []                                          When and where was last mammogram performed? 4/2016 with [facility]    Have you ever been told that there was a problem on your mammogram?   No Yes Comments   []                                  [x]                                  2012 had bruising/hematoma on L.  2014 had calcs on R.  2015, 2016 Benign results     Do you have breast implants? No Yes Comments   [x]                                  []                                       When was your last Pap test performed? 4/2016    Have you ever had an abnormal Pap test?   No Yes Comments   [x]                                  []                                       Have you had a hysterectomy? No Yes Comments (why)   [x]                                  []                                       Have you ever been diagnosed with any type of Cancer   No Yes Comments (type,when,where,type of treatment   [x]                                  []                                          Has a family member been diagnosed with breast or ovarian cancer? No Yes Comments (which family members, and type   []                                  [x]                                  One maternal cousin and niece with breast cancer     Did your mother take DEBRA? No Yes Unknown   []                                  []                                  X     Do you have a history of HIV exposure? No Yes    [x]                                  []                                         Have you been through menopause?    No Yes Date of LMP   []                                  [x]                                  2000     Are you taking hormone replacement therapy (HRT)     No Yes Comments   [x]                                  []                                       How many times have you been pregnant? 5       Number of live births ? 4    Are you experiencing any of the following? No Yes Comments   Nipple Discharge [x]                                  []                                     Breast Lump/Masses [x]                                  []                                     Breast Skin Changes [x]                                  []                                          No Yes Comments   Vaginal Discharge [x]                                  []                                     Abnormal/unusual vaginal bleeding [x]                                  []                                         Are you experiencing any other health problems?     Diabetes, HTN,  Elevated chol---followed at [facility] Moderate hepatomegaly with severe hepatic steatosis seen on CT  No gallstones  Incidental Findings:   · None    Test Results Pending at Discharge (will require follow up): · None     Outpatient Tests Requested:  · Outpatient follow-up with PCP  · Outpatient follow-up with GI for MRI with MRCP  Complications:  None    Reason for Admission: Epigastric abdominal pain    Hospital Course:     Elvis Harp is a 46 y o  male patient who originally presented to the hospital on 3/19/2018 due to epigastric abdominal pain  Patient reported that his epigastric pain started spontaneously about 1 week prior to admission, and noted that his pain was located in the center of his abdomen with radiation straight through to his back  He also noted a poor appetite on admission  He has a history of alcohol abuse and was reportedly drinking a 12 pack of beer per night up until 3 weeks prior to admission at which time he stopped drinking because he was not feeling well  On arrival to the hospital, lipase level was noted to be elevated to 676 and CT abdomen showed peripancreatic stranding consistent with acute pancreatitis and a 1 4 cm large peripancreatic lymph node  He was admitted to the hospital, started on clear liquid diet and received IV fluids  GI consult was obtained  His acute pancreatitis was suspected to be alcohol induced  He was also noted to have abnormal LFTs with severe hepatic steatosis on imagining which was felt to likely be due to alcohol use versus fatty liver disease  RUQ US was obtained and noted moderate hepatomegaly, severe hepatic steatosis and no gallstones  Chronic hepatitis panel was negative  During the patient's hospitalization, his lipase improved to 289  On day of discharge patient's abdominal pain has improved and he is able to tolerate a low-fat diet  Alcohol cessation was discussed with the patient    He is to follow up with Gastroenterology as an outpatient for an outpatient MRI with MRCP to further evaluate his peripancreatic lymphadenopathy noted on CT scan  Patient was also instructed to follow up with his PCP as an outpatient  Please see above list of diagnoses and related plan for additional information  Condition at Discharge: stable     Discharge Day Visit / Exam:     Subjective:  Patient reports improvement in his abdominal pain; hotes that his abdominal pain occasionally worsens to a 5/10, improve with oxycodone  Patient able to tolerate low fat diet this AM without difficulty  He denies nausea, vomiting or diarrhea  He is anxious to be discharged  Vitals: Blood Pressure: 152/73 (03/23/18 0631)  Pulse: 81 (03/23/18 0631)  Temperature: 98 2 °F (36 8 °C) (03/23/18 0631)  Temp Source: Oral (03/23/18 0631)  Respirations: 18 (03/23/18 0631)  Height: 5' 10" (177 8 cm) (03/19/18 2057)  Weight - Scale: 102 kg (225 lb) (03/19/18 2057)  SpO2: 95 % (03/23/18 0631)  Exam:   Physical Exam   Constitutional: He is oriented to person, place, and time  No distress  HENT:   Head: Normocephalic and atraumatic  Eyes: Conjunctivae are normal    Neck: Neck supple  Cardiovascular: Normal rate and regular rhythm  Pulmonary/Chest: Effort normal and breath sounds normal  No respiratory distress  Abdominal: Soft  Bowel sounds are normal  He exhibits no distension  There is no tenderness  Musculoskeletal: Normal range of motion  He exhibits no edema  Neurological: He is alert and oriented to person, place, and time  Skin: Skin is warm and dry  He is not diaphoretic  No erythema  Psychiatric: He has a normal mood and affect  His behavior is normal    Nursing note reviewed  Discharge instructions/Information to patient and family:   See after visit summary for information provided to patient and family  Provisions for Follow-Up Care:  See after visit summary for information related to follow-up care and any pertinent home health orders        Disposition: Home    For Discharges to [facility] SNF:   · Not Applicable to this Patient - Not Applicable to this Patient    Planned Readmission: none     Discharge Statement:  I spent 35 minutes discharging the patient  This time was spent on the day of discharge  I had direct contact with the patient on the day of discharge  Greater than 50% of the total time was spent examining patient, answering all patient questions, arranging and discussing plan of care with patient as well as directly providing post-discharge instructions  Additional time then spent on discharge activities  Discharge Medications:  See after visit summary for reconciled discharge medications provided to patient and family        ** Please Note: This note has been constructed using a voice recognition system **

## 2018-04-18 NOTE — TELEPHONE ENCOUNTER
Attempted to reach pt several times this afternoon, unable to leave her a VM. Pt left a vm stating something about a pain med refill? Uncertain which Rx she is referring to.

## 2018-04-20 RX ORDER — GABAPENTIN 100 MG/1
200 CAPSULE ORAL 3 TIMES DAILY
Qty: 180 CAP | Refills: 5 | Status: SHIPPED | OUTPATIENT
Start: 2018-04-20 | End: 2018-10-22 | Stop reason: SDUPTHER

## 2018-05-15 ENCOUNTER — HOSPITAL ENCOUNTER (OUTPATIENT)
Dept: LAB | Age: 63
Discharge: HOME | End: 2018-05-15

## 2018-05-15 ENCOUNTER — OFFICE VISIT (OUTPATIENT)
Dept: FAMILY MEDICINE CLINIC | Age: 63
End: 2018-05-15

## 2018-05-15 VITALS
WEIGHT: 234 LBS | SYSTOLIC BLOOD PRESSURE: 135 MMHG | DIASTOLIC BLOOD PRESSURE: 73 MMHG | TEMPERATURE: 98.4 F | BODY MASS INDEX: 40.85 KG/M2 | HEART RATE: 76 BPM

## 2018-05-15 DIAGNOSIS — E11.9 CONTROLLED TYPE 2 DIABETES MELLITUS WITHOUT COMPLICATION, WITHOUT LONG-TERM CURRENT USE OF INSULIN (HCC): ICD-10-CM

## 2018-05-15 DIAGNOSIS — E78.00 PURE HYPERCHOLESTEROLEMIA: ICD-10-CM

## 2018-05-15 DIAGNOSIS — E11.9 CONTROLLED TYPE 2 DIABETES MELLITUS WITHOUT COMPLICATION, WITHOUT LONG-TERM CURRENT USE OF INSULIN (HCC): Primary | ICD-10-CM

## 2018-05-15 DIAGNOSIS — M54.50 MIDLINE LOW BACK PAIN WITHOUT SCIATICA, UNSPECIFIED CHRONICITY: ICD-10-CM

## 2018-05-15 DIAGNOSIS — I10 ESSENTIAL HYPERTENSION: ICD-10-CM

## 2018-05-15 PROBLEM — E66.01 OBESITY, MORBID (HCC): Status: ACTIVE | Noted: 2018-05-15

## 2018-05-15 PROBLEM — E11.40 TYPE 2 DIABETES MELLITUS WITH DIABETIC NEUROPATHY (HCC): Status: ACTIVE | Noted: 2018-05-15

## 2018-05-15 LAB
CHOLEST SERPL-MCNC: 158 MG/DL
EST. AVERAGE GLUCOSE BLD GHB EST-MCNC: 143 MG/DL
GLUCOSE POC: 129 MG/DL
HBA1C MFR BLD: 6.6 % (ref 4.2–6.3)
HDLC SERPL-MCNC: 56 MG/DL
HDLC SERPL: 2.8 {RATIO} (ref 0–5)
LDLC SERPL CALC-MCNC: 85 MG/DL (ref 0–100)
LIPID PROFILE,FLP: NORMAL
TRIGL SERPL-MCNC: 85 MG/DL (ref ?–150)
VLDLC SERPL CALC-MCNC: 17 MG/DL

## 2018-05-15 PROCEDURE — 80061 LIPID PANEL: CPT | Performed by: FAMILY MEDICINE

## 2018-05-15 PROCEDURE — 83036 HEMOGLOBIN GLYCOSYLATED A1C: CPT | Performed by: FAMILY MEDICINE

## 2018-05-15 RX ORDER — METFORMIN HYDROCHLORIDE 500 MG/1
TABLET, EXTENDED RELEASE ORAL
Qty: 360 TAB | Refills: 1 | Status: SHIPPED | OUTPATIENT
Start: 2018-05-15 | End: 2018-11-07 | Stop reason: SDUPTHER

## 2018-05-15 RX ORDER — LOVASTATIN 20 MG/1
40 TABLET ORAL
Qty: 180 TAB | Refills: 1 | Status: SHIPPED | OUTPATIENT
Start: 2018-05-15 | End: 2018-11-07 | Stop reason: SDUPTHER

## 2018-05-15 RX ORDER — LISINOPRIL 10 MG/1
10 TABLET ORAL DAILY
Qty: 90 TAB | Refills: 1 | Status: SHIPPED | OUTPATIENT
Start: 2018-05-15 | End: 2018-11-07 | Stop reason: SDUPTHER

## 2018-05-15 RX ORDER — METHOCARBAMOL 750 MG/1
750 TABLET, FILM COATED ORAL 4 TIMES DAILY
Qty: 60 TAB | Refills: 3 | Status: SHIPPED | OUTPATIENT
Start: 2018-05-15 | End: 2018-05-16 | Stop reason: RX

## 2018-05-15 NOTE — PATIENT INSTRUCTIONS
You may take 2 advil twice daily as needed for back pain. You may take the new muscle relaxant, methocarbamol, 1 tablet every 6 hours as needed for spasm. Call us about which eye appointment you can make it to; it is the last Thursday of every month. Call us and press the nurse line to get a date and time from the nurse.

## 2018-05-15 NOTE — PROGRESS NOTES
Subjective:     Trevor Rodriguez is a 60 y.o. female seen for follow up of diabetes and htn. .. Diabetic Review of Systems - medication compliance: compliant all of the time, last eye exam approximately 1 year ago, acute symptoms are absent. .    Other symptoms and concerns: back is really starting to bother her again. Taking meds as directed, is avoiding ibuprofen altogether because she thought I didn't want her to take any. Thinks the neurontin helped at one point. Has a hard time getting here for chiropreactic. Was walking for exercise, but now finds she is too uncomfortable to. Lab Results   Component Value Date/Time    Hemoglobin A1c 6.4 (H) 11/28/2017 11:45 AM    Hemoglobin A1c 6.6 (H) 08/23/2017 03:13 PM    Hemoglobin A1c 6.4 (H) 01/24/2017 02:26 PM         Objective:     Vitals 5/15/2018 3/1/2018 3/1/2018 11/28/2017 8/23/2017 5/23/2017 4/8/2017   Blood Pressure 135/73 152/80 178/91 146/77 139/66 150/63 148/80   Pulse 76 74 83 69 69 76 -   Temp 98.4 - 97.8 98.3 98.1 98.2 -   Resp - - - - - - -   Height - - 5' 3.465\" - - - -   Weight 234 lb - 241 lb 9.6 oz 242 lb 241 lb 241 lb -   SpO2 - - - - - - -   BSA - - 2.22 m2 - - - -   BMI - - 42.17 kg/m2 - - - -   BP comment - - - - - - -   Some recent data might be hidden     Appearance: overweight, sitting holding her back, looks uncomfortable. Exam: heart sounds normal rate, regular rhythm, normal S1, S2, no murmurs, rubs, clicks or gallops, chest clear, no carotid bruits, feet: warm, good capillary refill, no trophic changes or ulcerative lesions and normal DP and PT pulses. Tender right mid-lumbar paraspinous muscles. Gait stiff. Lab review:    Assessment/Plan:     diabetes well controlled, hypertension well controlled, hyperlipidemia well controlled, get eye exam when possible. chronic LBP with increased sx. .  Will see if AN has any PT near her house in [address] [place] [address]. Add robaxin and can use small dose of advil prn if it helps.

## 2018-05-15 NOTE — PROGRESS NOTES
Chief Complaint   Patient presents with    Hypertension    Diabetes    Spasms     back, x 2 wks     Coordination of Care  1. Have you been to the ER, urgent care clinic since your last visit?   Hospitalized since your last visit? no

## 2018-05-16 RX ORDER — CYCLOBENZAPRINE HCL 10 MG
TABLET ORAL
Qty: 20 TAB | Refills: 1 | Status: SHIPPED | OUTPATIENT
Start: 2018-05-16 | End: 2018-07-14

## 2018-07-14 ENCOUNTER — HOSPITAL ENCOUNTER (OUTPATIENT)
Dept: MAMMOGRAPHY | Age: 63
Discharge: HOME | End: 2018-07-14
Attending: NURSE PRACTITIONER

## 2018-07-14 ENCOUNTER — OFFICE VISIT (OUTPATIENT)
Dept: FAMILY PLANNING/WOMEN'S HEALTH CLINIC | Age: 63
End: 2018-07-14

## 2018-07-14 VITALS — SYSTOLIC BLOOD PRESSURE: 124 MMHG | DIASTOLIC BLOOD PRESSURE: 78 MMHG

## 2018-07-14 DIAGNOSIS — Z12.31 SCREENING MAMMOGRAM, ENCOUNTER FOR: ICD-10-CM

## 2018-07-14 DIAGNOSIS — Z12.31 SCREENING MAMMOGRAM, ENCOUNTER FOR: Primary | ICD-10-CM

## 2018-07-14 PROCEDURE — 77067 SCR MAMMO BI INCL CAD: CPT

## 2018-07-14 NOTE — PROGRESS NOTES
HISTORY OF PRESENT ILLNESS  Willie Carlson is a 60 y.o. female. HPI   63yoF,  here for [facility] exam. Ms. Abdelrahman You denies abnormal SBE's, performs monthly. She denies abnormal vaginal discharge and bloating, no pelvic pain. H/O right breast calcs in . Post-menopause. She had a colonoscopy in her early 52's and more recently a stool test that was sent away- \"everything was negative\". FHX of breast cancer in her niece who is alive and well; maternal cousin with ovarian cancer who also is alive and well. Non-smoker    Review of Systems   Constitutional: Negative for chills, diaphoresis, malaise/fatigue and weight loss. Gastrointestinal: Negative for abdominal pain, blood in stool, constipation and diarrhea. Physical Exam   Constitutional: She is oriented to person, place, and time. She appears well-developed and well-nourished. Pulmonary/Chest: Right breast exhibits no inverted nipple, no mass, no nipple discharge, no skin change and no tenderness. Left breast exhibits no inverted nipple, no mass, no nipple discharge, no skin change and no tenderness. Breasts are symmetrical.   Genitourinary: Rectum normal, vagina normal and uterus normal. Rectal exam shows no mass, no tenderness and guaiac negative stool. Pelvic exam was performed with patient supine. There is no rash, tenderness or lesion on the right labia. There is no rash, tenderness or lesion on the left labia. Cervix exhibits no motion tenderness, no discharge and no friability. Right adnexum displays no mass, no tenderness and no fullness. Left adnexum displays no mass, no tenderness and no fullness. No erythema, tenderness or bleeding in the vagina. No vaginal discharge found. Lymphadenopathy:     She has no cervical adenopathy. She has no axillary adenopathy. Right: No inguinal and no supraclavicular adenopathy present. Left: No inguinal and no supraclavicular adenopathy present.    Neurological: She is alert and oriented to person, place, and time. Skin: Skin is warm and dry. Psychiatric: She has a normal mood and affect. Her behavior is normal. Thought content normal.   Nursing note and vitals reviewed. ASSESSMENT and PLAN  1. [facility] exam  2. CBE normal  3. Screening mammogram today       -H/O right breast calcifications, 2013 (stable)  4. FHX of breast cancer, niece- alive and well; maternal cousin with ovarian cancer- alive and well  5.  Post-menopause

## 2018-07-14 NOTE — PROGRESS NOTES
[facility]ISTORY [facility]STION[facility]       No Yes Comments   Has a doctor ever seen or felt anything wrong with your breast? []                                  [x]                                  calicifcations   Have you ever had a breast biopsy? [x]                                  []                                          When and where was last mammogram performed? 2017 here    Have you ever been told that there was a problem on your mammogram?   No Yes Comments   []                                  [x]                                  califications     Do you have breast implants? No Yes Comments   [x]                                  []                                       When was your last Pap test performed? 2016     Have you ever had an abnormal Pap test?   No Yes Comments   [x]                                  []                                       Have you had a hysterectomy? No Yes Comments (why)   [x]                                  []                                       Have you ever been diagnosed with any type of Cancer   No Yes Comments (type,when,where,type of treatment   [x]                                  []                                          Has a family member been diagnosed with breast or ovarian cancer? No Yes Comments (which family members, and type   []                                  [x]                                  Cousin ovarian or uterine; niece breast cancer     Did your mother take DEBRA? No Yes Unknown   []                                  []                                  X     Do you have a history of HIV exposure? No Yes    [x]                                  []                                         Have you been through menopause?    No Yes Date of LMP   []                                  [x]                                       Are you taking hormone replacement therapy (HRT)     No Yes Comments   [x]                                  [] How many times have you been pregnant? 5      Number of live births ? 4    Are you experiencing any of the following? No Yes Comments   Nipple Discharge [x]                                  []                                     Breast Lump/Masses [x]                                  []                                     Breast Skin Changes [x]                                  []                                          No Yes Comments   Vaginal Discharge [x]                                  []                                     Abnormal/unusual vaginal bleeding [x]                                  []                                         Are you experiencing any other health problems?

## 2018-08-20 ENCOUNTER — OFFICE VISIT (OUTPATIENT)
Dept: FAMILY MEDICINE CLINIC | Age: 63
End: 2018-08-20

## 2018-08-20 ENCOUNTER — HOSPITAL ENCOUNTER (OUTPATIENT)
Dept: LAB | Age: 63
Discharge: HOME | End: 2018-08-20

## 2018-08-20 VITALS
SYSTOLIC BLOOD PRESSURE: 131 MMHG | HEART RATE: 93 BPM | TEMPERATURE: 98.7 F | BODY MASS INDEX: 41.2 KG/M2 | WEIGHT: 236 LBS | DIASTOLIC BLOOD PRESSURE: 68 MMHG

## 2018-08-20 DIAGNOSIS — Z13.9 ENCOUNTER FOR SCREENING: Primary | ICD-10-CM

## 2018-08-20 DIAGNOSIS — E11.9 CONTROLLED TYPE 2 DIABETES MELLITUS WITHOUT COMPLICATION, WITHOUT LONG-TERM CURRENT USE OF INSULIN (HCC): ICD-10-CM

## 2018-08-20 DIAGNOSIS — M54.50 MIDLINE LOW BACK PAIN WITHOUT SCIATICA, UNSPECIFIED CHRONICITY: ICD-10-CM

## 2018-08-20 DIAGNOSIS — I10 ESSENTIAL HYPERTENSION: ICD-10-CM

## 2018-08-20 DIAGNOSIS — Z13.9 ENCOUNTER FOR SCREENING: ICD-10-CM

## 2018-08-20 LAB
ANION GAP SERPL CALC-SCNC: 7 MMOL/L (ref 5–15)
BUN SERPL-MCNC: 10 MG/DL (ref 6–20)
BUN/CREAT SERPL: 12 (ref 12–20)
CALCIUM SERPL-MCNC: 9.2 MG/DL (ref 8.5–10.1)
CHLORIDE SERPL-SCNC: 103 MMOL/L (ref 97–108)
CO2 SERPL-SCNC: 30 MMOL/L (ref 21–32)
CREAT SERPL-MCNC: 0.85 MG/DL (ref 0.55–1.02)
EST. AVERAGE GLUCOSE BLD GHB EST-MCNC: 148 MG/DL
GLUCOSE POC: NORMAL MG/DL
GLUCOSE SERPL-MCNC: 168 MG/DL (ref 65–100)
HBA1C MFR BLD: 6.8 % (ref 4.2–6.3)
POTASSIUM SERPL-SCNC: 5.2 MMOL/L (ref 3.5–5.1)
SODIUM SERPL-SCNC: 140 MMOL/L (ref 136–145)

## 2018-08-20 PROCEDURE — 80048 BASIC METABOLIC PNL TOTAL CA: CPT | Performed by: FAMILY MEDICINE

## 2018-08-20 PROCEDURE — 83036 HEMOGLOBIN GLYCOSYLATED A1C: CPT | Performed by: FAMILY MEDICINE

## 2018-08-20 RX ORDER — CYCLOBENZAPRINE HCL 10 MG
5 TABLET ORAL
Qty: 20 TAB | Refills: 1 | Status: SHIPPED | OUTPATIENT
Start: 2018-08-20 | End: 2018-09-28 | Stop reason: SDUPTHER

## 2018-08-20 NOTE — PROGRESS NOTES
Subjective:     Shirley Solis is a 60 y.o. female seen for follow up of diabetes. She also has hypertension and hyperlipidemia. Diabetic Review of Systems - medication compliance: compliant all of the time. Trying to exercise by walking. Back hurts worst when she is walking, no c/o calf pain with walking. Her back pain is mostly upper lumbar, right and left, with left 'hip' pain really more in the gluteus area. No sciatica sx. Happy with current dose of gabapentin, uses up to 6 advil a day on a bad day, usually 2 twice a day. Other symptoms and concerns: wants letter for a volunteer PT who says she will help her.     Patient Active Problem List   Diagnosis Code    Chronic lower back pain M54.5, G89.29    Controlled diabetes mellitus type II without complication ([facility] P44.7    Pure hypercholesterolemia E78.00    Essential hypertension I10    Obesity, morbid ([facility] E66.01    Type 2 diabetes mellitus with diabetic neuropathy ([facility] E11.40        Lab Results   Component Value Date/Time    Sodium 140 11/28/2017 11:45 AM    Potassium 4.2 11/28/2017 11:45 AM    Chloride 106 11/28/2017 11:45 AM    CO2 28 11/28/2017 11:45 AM    Anion gap 6 11/28/2017 11:45 AM    Glucose 110 (H) 11/28/2017 11:45 AM    BUN 9 11/28/2017 11:45 AM    Creatinine 0.77 11/28/2017 11:45 AM    BUN/Creatinine ratio 12 11/28/2017 11:45 AM    GFR est AA >60 11/28/2017 11:45 AM    GFR est non-AA >60 11/28/2017 11:45 AM    Calcium 9.6 11/28/2017 11:45 AM         Lab Results   Component Value Date/Time    Hemoglobin A1c 6.6 (H) 05/15/2018 02:55 PM    Hemoglobin A1c 6.4 (H) 11/28/2017 11:45 AM    Hemoglobin A1c 6.6 (H) 08/23/2017 03:13 PM         Objective:     Vitals 8/20/2018 7/14/2018 5/15/2018 3/1/2018 3/1/2018 11/28/2017 8/23/2017   Blood Pressure 131/68 124/78 135/73 152/80 178/91 146/77 139/66   Pulse 93 - 76 74 83 69 69   Temp 98.7 - 98.4 - 97.8 98.3 98.1   Resp - - - - - - -   Height - - - - 5' 3.465\" - -   Weight 236 lb - 234 lb - 241 lb 9.6 oz 242 lb 241 lb   SpO2 - - - - - - -   BSA - - - - 2.22 m2 - -   BMI - - - - 42.17 kg/m2 - -   BP comment - - - - - - -   Some recent data might be hidden     Appearance: alert, well appearing, and in no distress and overweight. Exam: heart sounds normal rate, regular rhythm, normal S1, S2, no murmurs, rubs, clicks or gallops, chest clear, no hepatosplenomegaly, no carotid bruits, feet: warm, good capillary refill and I could not feel DP or PT pulses in either feet. Tender bilat paraspinous muscles at upper lumbar region and at left piriformis  Lab review:    Assessment/Plan:     diabetes well controlled, hypertension well controlled, hyperlipidemia well controlled, chronic LBP. Add 1 neurontin at hs instead of addl advil on bad days. ?PAD. Recheck pulses next visit, I may not have taken enough time to palpate today.

## 2018-08-20 NOTE — PROGRESS NOTES
Coordination of Care  1. Have you been to the ER, urgent care clinic since your last visit? Hospitalized since your last visit? No    2. Have you seen or consulted any other health care providers outside of the [address] [place] [address] since your last visit? Include any pap smears or colon screening. No    Does the patient need refills? NO    Learning Assessment Complete?  yes  Depression Screening complete in the past 12 months? yes     Results for orders placed or performed in visit on 08/20/18   AMB POC GLUCOSE BLOOD, BY GLUCOSE MONITORING DEVICE   Result Value Ref Range    Glucose POC nf 180 mg/dL

## 2018-08-21 NOTE — PROGRESS NOTES
K+ a little too high, doesn't need to eat a banana a day. A1C is a little higher. No med change but if drops calories in diet a little this will probably stabilize out. (bananas have quite a few calories, my do better with just stopping these).

## 2018-10-04 RX ORDER — CYCLOBENZAPRINE HCL 10 MG
TABLET ORAL
Qty: 20 TAB | Refills: 1 | Status: SHIPPED | OUTPATIENT
Start: 2018-10-04 | End: 2018-11-07

## 2018-10-23 RX ORDER — GABAPENTIN 100 MG/1
CAPSULE ORAL
Qty: 180 CAP | Refills: 5 | Status: SHIPPED | OUTPATIENT
Start: 2018-10-23

## 2018-11-07 ENCOUNTER — OFFICE VISIT (OUTPATIENT)
Dept: FAMILY MEDICINE CLINIC | Age: 63
End: 2018-11-07

## 2018-11-07 VITALS
HEART RATE: 93 BPM | SYSTOLIC BLOOD PRESSURE: 132 MMHG | TEMPERATURE: 98.2 F | DIASTOLIC BLOOD PRESSURE: 82 MMHG | BODY MASS INDEX: 40.32 KG/M2 | WEIGHT: 231 LBS

## 2018-11-07 DIAGNOSIS — I10 ESSENTIAL HYPERTENSION: ICD-10-CM

## 2018-11-07 DIAGNOSIS — E78.00 PURE HYPERCHOLESTEROLEMIA: ICD-10-CM

## 2018-11-07 DIAGNOSIS — Z23 ENCOUNTER FOR IMMUNIZATION: ICD-10-CM

## 2018-11-07 DIAGNOSIS — Z13.9 ENCOUNTER FOR SCREENING: Primary | ICD-10-CM

## 2018-11-07 LAB — GLUCOSE POC: NORMAL MG/DL

## 2018-11-07 RX ORDER — METFORMIN HYDROCHLORIDE 500 MG/1
TABLET, EXTENDED RELEASE ORAL
Qty: 360 TAB | Refills: 1 | Status: SHIPPED | OUTPATIENT
Start: 2018-11-07

## 2018-11-07 RX ORDER — LOVASTATIN 20 MG/1
40 TABLET ORAL
Qty: 180 TAB | Refills: 1 | Status: SHIPPED | OUTPATIENT
Start: 2018-11-07

## 2018-11-07 RX ORDER — LISINOPRIL 10 MG/1
10 TABLET ORAL DAILY
Qty: 90 TAB | Refills: 1 | Status: SHIPPED | OUTPATIENT
Start: 2018-11-07

## 2018-11-07 NOTE — PROGRESS NOTES
Coordination of Care  1. Have you been to the ER, urgent care clinic since your last visit? Hospitalized since your last visit? No    2. Have you seen or consulted any other health care providers outside of the [address] [place] [address] since your last visit? Include any pap smears or colon screening. No    Does the patient need refills?  YES    Learning Assessment Complete? yes  Results for orders placed or performed in visit on 11/07/18   AMB POC GLUCOSE BLOOD, BY GLUCOSE MONITORING DEVICE   Result Value Ref Range    Glucose  Fasting mg/dL

## 2018-11-07 NOTE — PROGRESS NOTES
Subjective:     Emily Solomon is a 60 y.o. female seen for follow up of diabetes. She also has hypertension and hyperlipidemia. Diabetic Review of Systems - medication compliance: compliant all of the time, home glucose monitoring: is performed regularly, fasting values range 120s, nonfasting values range 180s. Other symptoms and concerns: back pain is doing better since she has been working with PT. Walks for exercise and is limited by either back pain or sometimes pain shooting down the left leg. Doesn't get exertional calf cramps. .    Patient Active Problem List   Diagnosis Code    Chronic lower back pain M54.5, G89.29    Controlled diabetes mellitus type II without complication ([facility] C24.3    Pure hypercholesterolemia E78.00    Essential hypertension I10    [facility], [facility] E66.01    Type 2 diabetes mellitus with diabetic neuropathy (HCC) E11.40          Lab Results   Component Value Date/Time    Hemoglobin A1c 6.8 (H) 08/20/2018 01:25 PM    Hemoglobin A1c 6.6 (H) 05/15/2018 02:55 PM    Hemoglobin A1c 6.4 (H) 11/28/2017 11:45 AM         Objective:     Vitals 11/7/2018 8/20/2018 7/14/2018 5/15/2018 3/1/2018 3/1/2018 11/28/2017   Blood Pressure 132/82 131/68 124/78 135/73 152/80 178/91 146/77   Pulse 93 93 - 76 74 83 69   Temp 98.2 98.7 - 98.4 - 97.8 98.3   Resp - - - - - - -   Height - - - - - 5' 3.465\" -   Weight 231 lb 236 lb - 234 lb - 241 lb 9.6 oz 242 lb   SpO2 - - - - - - -   BSA 2.17 m2 2.19 m2 - 2.18 m2 - 2.22 m2 2.22 m2   BMI 40.32 kg/m2 41.2 kg/m2 - 40.85 kg/m2 - 42.17 kg/m2 42.2 kg/m2   BP comment - - - - - - -   Some recent data might be hidden     Appearance: alert, well appearing, and in no distress. Exam: heart sounds normal rate, regular rhythm, normal S1, S2, no murmurs, rubs, clicks or gallops, chest clear, no carotid bruits, feet: warm, good capillary refill and DP absent bilateral, PT absent bilat, with no ulcerations or deformity.   Lab review:    Assessment/Plan: diabetes well controlled, hypertension well controlled, hyperlipidemia well controlled. chronic LBP, doing well. Refill gabapentin. Absent DP and PT pulses, check SUDHIR. She may qualify for medicaid. Gave her the letter with phone numbers to call to check. ICD-10-CM ICD-9-CM    1. Encounter for screening Z13.9 V82.9 AMB POC GLUCOSE BLOOD, BY GLUCOSE MONITORING DEVICE      ANKLE BRACHIAL INDEX   2. Essential hypertension I10 401.9 lisinopril (PRINIVIL, ZESTRIL) 10 mg tablet   3. Pure hypercholesterolemia E78.00 272.0 lovastatin (MEVACOR) 20 mg tablet   4.  Encounter for immunization Z23 V03.89 PNEUMOCOCCAL CONJ VACCINE 13 VALENT IM

## 2018-11-07 NOTE — PROGRESS NOTES
Gave vaccine per protocol. See scanned vaccination consent form. Documented in [address]. Gave patient VIS information sheet and reviewed instructions regarding possible adverse side effects and allergic reactions. No adverse reaction noted at time of discharge.  Geovanny Jonas RN

## 2018-11-14 ENCOUNTER — HOSPITAL ENCOUNTER (OUTPATIENT)
Dept: VASCULAR SURGERY | Age: 63
Discharge: HOME | End: 2018-11-14
Attending: FAMILY MEDICINE
Payer: SELF-PAY

## 2018-11-14 DIAGNOSIS — Z13.9 ENCOUNTER FOR SCREENING: ICD-10-CM

## 2018-11-14 PROCEDURE — 93922 UPR/L XTREMITY ART 2 LEVELS: CPT

## 2018-11-14 NOTE — PROCEDURES
[address]  *** FINAL REPORT ***    Name: Alli Hawthorne  MRN: [id number]    Outpatient  : 27 Dec 1955  HIS Order #: [id number]  [id number] [facility] Visit #: [id number]  Date: 2018    TYPE OF TEST: Peripheral Arterial Testing    REASON FOR TEST  No pulses bilateral lower extremity    Right Leg  Doppler:  PVR:  Ankle/Brachial: 1.05    Left Leg  Doppler:  PVR:  Ankle/Brachial: 1.06    INTERPRETATION/FINDINGS  PROCEDURE:  Evaluation of lower extremity arteries with systolic blood   pressure measurement at the ankle and brachial level and calculation  of the ankle/brachial pressure index (SUDHIR). Unless otherwise  indicated, the exam also includes pulse volume recording (PVR)  plethysmography at the ankle level. FINDINGS:  SUDHIR is normal on the right and the left. PVR waveforms are   normal at the right and left ankle. IMPRESSION:  No evidence of hemodynamically significant lower  extremity arterial obstruction. ADDITIONAL COMMENTS    I have personally reviewed the data relevant to the interpretation of  this  study.     TECHNOLOGIST: Carmen Mckeon  Signed: 2018 04:32 PM    PHYSICIAN: Lizzette Edwards MD  Signed: 2018 01:35 PM

## 2018-11-16 NOTE — PROGRESS NOTES
Contacted pt to let her know the ABIs bilat were nl and back pain is not coming from any arterial problems.

## 2022-03-19 PROBLEM — E66.01 OBESITY, MORBID (HCC): Status: ACTIVE | Noted: 2018-05-15

## 2022-03-19 PROBLEM — E11.40 TYPE 2 DIABETES MELLITUS WITH DIABETIC NEUROPATHY (HCC): Status: ACTIVE | Noted: 2018-05-15

## 2022-03-19 PROBLEM — I10 ESSENTIAL HYPERTENSION: Status: ACTIVE | Noted: 2017-02-21

## 2023-09-25 ENCOUNTER — TELEPHONE (OUTPATIENT)
Age: 68
End: 2023-09-25

## 2023-09-25 NOTE — TELEPHONE ENCOUNTER
Fax sent to [facility] with imaging report from pt's only [facility] visit. Explained in fax that if they need digital images they will need to request that from [employer], where images were done.  Micha Gonzalez RN

## 2025-03-15 ENCOUNTER — OFFICE VISIT (OUTPATIENT)
Age: 70
End: 2025-03-15

## 2025-03-15 VITALS
HEART RATE: 113 BPM | TEMPERATURE: 98.8 F | DIASTOLIC BLOOD PRESSURE: 66 MMHG | SYSTOLIC BLOOD PRESSURE: 97 MMHG | OXYGEN SATURATION: 93 % | WEIGHT: 214 LBS | RESPIRATION RATE: 22 BRPM

## 2025-03-15 DIAGNOSIS — J18.9 PNEUMONIA OF LEFT LOWER LOBE DUE TO INFECTIOUS ORGANISM: Primary | ICD-10-CM

## 2025-03-15 RX ORDER — DOXYCYCLINE HYCLATE 100 MG
100 TABLET ORAL 2 TIMES DAILY
Qty: 14 TABLET | Refills: 0 | Status: SHIPPED | OUTPATIENT
Start: 2025-03-15 | End: 2025-03-22

## 2025-03-15 RX ORDER — LISINOPRIL 10 MG/1
10 TABLET ORAL DAILY
COMMUNITY
Start: 2025-02-07

## 2025-03-15 RX ORDER — GUAIFENESIN 600 MG/1
600 TABLET, EXTENDED RELEASE ORAL 2 TIMES DAILY
Qty: 30 TABLET | Refills: 0 | Status: SHIPPED | OUTPATIENT
Start: 2025-03-15 | End: 2025-03-30

## 2025-03-15 RX ORDER — APIXABAN 5 MG/1
5 TABLET, FILM COATED ORAL 2 TIMES DAILY
COMMUNITY
Start: 2025-03-06

## 2025-03-15 RX ORDER — ALBUTEROL SULFATE 90 UG/1
2 INHALANT RESPIRATORY (INHALATION) 4 TIMES DAILY PRN
Qty: 18 G | Refills: 0 | Status: SHIPPED | OUTPATIENT
Start: 2025-03-15

## 2025-03-15 RX ORDER — GABAPENTIN 300 MG/1
300 CAPSULE ORAL 3 TIMES DAILY
COMMUNITY

## 2025-03-15 RX ORDER — LOVASTATIN 20 MG/1
20 TABLET ORAL NIGHTLY
COMMUNITY
Start: 2025-02-07

## 2025-03-15 ASSESSMENT — ENCOUNTER SYMPTOMS: COUGH: 1

## 2025-03-15 NOTE — PATIENT INSTRUCTIONS
Thank you for visiting [facility] today.    For relief at home, you may use the following to help with your cough:  Albuterol inhaler as needed  Mucinex as directed  Drink plenty of water  Minimize contact with irritants such as cigarette smoke  Increase humidification in your home, preferably cool mist  Cough medications as prescribed    Follow up with your PCP if no improvement in 2 weeks.

## 2025-03-15 NOTE — PROGRESS NOTES
Subjective     Chief Complaint   Patient presents with    Cough     Pt presents today with persistent cough, rib pain with cough [id number]           The patient is a 69-year-old female with diabetes mellitus (DM), hypertension (HTN), and a history of pulmonary embolism (PE). She presents with a 4-week history of respiratory symptoms. Initially, she experienced throat irritation and rhinorrhea.  The patient reports a productive cough with green sputum and green nasal discharge. The cough has progressively worsened and is now constant, affecting her sleep and requiring her to sit upright. She has experienced shortness of breath (SOB) but states that it does not feel the same as her previous PE symptoms. She denies any changes in activity level and believes her SOB is due to the productive cough.  She denies fever, leg pain and chest tightness associated with coughing episodes. She has not used any medications for symptom relief.  The patient has a history of pulmonary embolism one year ago, which required hospitalization. She distinguishes her current symptoms from those experienced during the PE episode.  The patient has received influenza, COVID-19, and shingles vaccinations but is unsure about her pneumococcal vaccination status. She denies a history of pneumonia or asthma. Her last primary care visit was in October of the previous year.        Cough        History reviewed. No pertinent past medical history.    History reviewed. No pertinent surgical history.    History reviewed. No pertinent family history.    Allergies   Allergen Reactions    Penicillins Rash     As child.       Social History     Tobacco Use    Smoking status: Unknown       Vitals:    03/15/25 1305   BP:    Pulse:    Resp:    Temp:    SpO2: 93%       Objective     Physical Exam  Vitals and nursing note reviewed.   Constitutional:       General: She is not in acute distress.     Appearance: Normal appearance. She is obese. She is not